# Patient Record
Sex: FEMALE | Race: WHITE | NOT HISPANIC OR LATINO | Employment: UNEMPLOYED | URBAN - METROPOLITAN AREA
[De-identification: names, ages, dates, MRNs, and addresses within clinical notes are randomized per-mention and may not be internally consistent; named-entity substitution may affect disease eponyms.]

---

## 2023-10-19 PROBLEM — F41.8 ANTICIPATORY ANXIETY: Status: ACTIVE | Noted: 2023-10-19

## 2023-10-19 PROBLEM — F31.9 BIPOLAR DEPRESSION (MULTI): Status: ACTIVE | Noted: 2023-10-19

## 2023-10-19 PROBLEM — F40.10 SOCIAL ANXIETY DISORDER: Status: ACTIVE | Noted: 2023-10-19

## 2023-10-19 PROBLEM — M26.69 TMJ CREPITUS: Status: ACTIVE | Noted: 2023-10-19

## 2023-10-19 PROBLEM — F33.0 MAJOR DEPRESSIVE DISORDER, RECURRENT EPISODE, MILD (CMS-HCC): Status: ACTIVE | Noted: 2023-10-19

## 2023-10-19 PROBLEM — H93.12 TINNITUS OF LEFT EAR: Status: ACTIVE | Noted: 2023-10-19

## 2023-10-19 PROBLEM — R94.120 ABNORMAL OTOACOUSTIC EMISSIONS TEST: Status: ACTIVE | Noted: 2023-10-19

## 2023-10-19 RX ORDER — PNV NO.95/FERROUS FUM/FOLIC AC 28MG-0.8MG
TABLET ORAL
COMMUNITY
End: 2023-10-20 | Stop reason: ALTCHOICE

## 2023-10-19 RX ORDER — BUSPIRONE HYDROCHLORIDE 15 MG/1
1 TABLET ORAL 2 TIMES DAILY
COMMUNITY
Start: 2018-04-17 | End: 2023-10-20 | Stop reason: ALTCHOICE

## 2023-10-19 RX ORDER — NORGESTIMATE AND ETHINYL ESTRADIOL 7DAYSX3 LO
KIT ORAL
COMMUNITY
End: 2023-10-20 | Stop reason: ALTCHOICE

## 2023-10-19 RX ORDER — DULOXETIN HYDROCHLORIDE 30 MG/1
CAPSULE, DELAYED RELEASE ORAL
COMMUNITY
Start: 2019-06-18 | End: 2023-10-20 | Stop reason: ALTCHOICE

## 2023-10-19 RX ORDER — CHOLECALCIFEROL (VITAMIN D3) 50 MCG
TABLET ORAL
COMMUNITY
End: 2023-10-20 | Stop reason: ALTCHOICE

## 2023-10-19 RX ORDER — PROPRANOLOL HYDROCHLORIDE 20 MG/1
TABLET ORAL
COMMUNITY
Start: 2019-06-18 | End: 2023-10-20 | Stop reason: ALTCHOICE

## 2023-10-20 ENCOUNTER — HOSPITAL ENCOUNTER (OUTPATIENT)
Dept: CARDIOLOGY | Facility: CLINIC | Age: 28
Discharge: HOME | End: 2023-10-20
Payer: COMMERCIAL

## 2023-10-20 ENCOUNTER — OFFICE VISIT (OUTPATIENT)
Dept: CARDIOLOGY | Facility: CLINIC | Age: 28
End: 2023-10-20
Payer: COMMERCIAL

## 2023-10-20 VITALS
OXYGEN SATURATION: 98 % | WEIGHT: 146.8 LBS | SYSTOLIC BLOOD PRESSURE: 131 MMHG | BODY MASS INDEX: 23.59 KG/M2 | HEART RATE: 84 BPM | DIASTOLIC BLOOD PRESSURE: 75 MMHG | HEIGHT: 66 IN

## 2023-10-20 DIAGNOSIS — R00.2 PALPITATIONS: ICD-10-CM

## 2023-10-20 DIAGNOSIS — R00.2 PALPITATIONS: Primary | ICD-10-CM

## 2023-10-20 PROCEDURE — 93246 EXT ECG>7D<15D RECORDING: CPT

## 2023-10-20 PROCEDURE — 99204 OFFICE O/P NEW MOD 45 MIN: CPT | Performed by: INTERNAL MEDICINE

## 2023-10-20 PROCEDURE — 99214 OFFICE O/P EST MOD 30 MIN: CPT | Performed by: INTERNAL MEDICINE

## 2023-10-20 PROCEDURE — 1036F TOBACCO NON-USER: CPT | Performed by: INTERNAL MEDICINE

## 2023-10-20 RX ORDER — RISPERIDONE 0.25 MG/1
0.25 TABLET ORAL DAILY
COMMUNITY

## 2023-10-20 RX ORDER — ISOTRETINOIN 40 MG/1
40 CAPSULE ORAL
COMMUNITY

## 2023-10-20 RX ORDER — ATORVASTATIN CALCIUM 80 MG/1
80 TABLET, FILM COATED ORAL DAILY
COMMUNITY

## 2023-10-20 RX ORDER — TRAZODONE HYDROCHLORIDE 50 MG/1
25 TABLET ORAL NIGHTLY
COMMUNITY

## 2023-10-20 ASSESSMENT — ENCOUNTER SYMPTOMS
FALLS: 0
DIARRHEA: 0
HEMOPTYSIS: 0
MEMORY LOSS: 0
FEVER: 0
OCCASIONAL FEELINGS OF UNSTEADINESS: 0
HEADACHES: 0
ABDOMINAL PAIN: 0
CONSTIPATION: 0
BLOATING: 0
NAUSEA: 0
DEPRESSION: 0
HEMATURIA: 0
COUGH: 0
DYSURIA: 0
LOSS OF SENSATION IN FEET: 0
ALTERED MENTAL STATUS: 0
CHILLS: 0
MYALGIAS: 0
WHEEZING: 0
DEPRESSION: 1
VOMITING: 0

## 2023-10-20 ASSESSMENT — PATIENT HEALTH QUESTIONNAIRE - PHQ9
2. FEELING DOWN, DEPRESSED OR HOPELESS: SEVERAL DAYS
1. LITTLE INTEREST OR PLEASURE IN DOING THINGS: NOT AT ALL
SUM OF ALL RESPONSES TO PHQ9 QUESTIONS 1 AND 2: 1

## 2023-10-20 ASSESSMENT — COLUMBIA-SUICIDE SEVERITY RATING SCALE - C-SSRS
1. IN THE PAST MONTH, HAVE YOU WISHED YOU WERE DEAD OR WISHED YOU COULD GO TO SLEEP AND NOT WAKE UP?: NO
6. HAVE YOU EVER DONE ANYTHING, STARTED TO DO ANYTHING, OR PREPARED TO DO ANYTHING TO END YOUR LIFE?: NO
2. HAVE YOU ACTUALLY HAD ANY THOUGHTS OF KILLING YOURSELF?: NO

## 2023-10-20 ASSESSMENT — PAIN SCALES - GENERAL: PAINLEVEL: 0-NO PAIN

## 2023-10-20 NOTE — PROGRESS NOTES
Consult requested by OLIVE Tellez    Chief complaint:  Palps     HPI  27 yo WF w/ h/o HLD, anx/dep now here for cardiology consult. Over the past few months, she is having occ palps (fluttering, hard beats, skips) x ~30 minutes, usually at night (reclined or supine), occ assoc hard pulse in R neck.   No chest pain. No dyspnea at rest. No ZAVALETA. No orthopnea/PND. No LH/dizzy/syncope. No edema. No claudication. No cough. +occ LLE pain.  ECG 9/23: SR (65), occ PVCs    Review of Systems   Constitutional: Negative for chills, fever and malaise/fatigue.   HENT:  Negative for hearing loss.    Eyes:  Negative for visual disturbance.   Respiratory:  Negative for cough, hemoptysis and wheezing.    Skin:  Negative for rash.   Musculoskeletal:  Negative for falls and myalgias.   Gastrointestinal:  Negative for bloating, abdominal pain, constipation, diarrhea, dysphagia, nausea and vomiting.   Genitourinary:  Negative for dysuria and hematuria.   Neurological:  Negative for headaches.   Psychiatric/Behavioral:  Negative for altered mental status, depression and memory loss.         Social History     Tobacco Use    Smoking status: Never    Smokeless tobacco: Never   Substance Use Topics    Alcohol use: Yes     Alcohol/week: 1.0 standard drink of alcohol     Types: 1 Glasses of wine per week        Family History   Problem Relation Name Age of Onset    Anxiety disorder Mother      Depression Mother      Thyroid disease Mother      Cancer Father      Anxiety disorder Mother's Sister      Depression Mother's Sister      Alcohol abuse Maternal Grandfather          Allergies   Allergen Reactions    Sertraline Unknown        Current Outpatient Medications   Medication Instructions    atorvastatin (LIPITOR) 80 mg, oral, Daily    ISOtretinoin (ACCUTANE) 40 mg, oral, 2 times daily with meals    NORETHINDRONE, CONTRACEPTIVE, ORAL     risperiDONE (RISPERDAL) 0.25 mg, oral, Daily    traZODone (DESYREL) 25 mg, oral, Nightly        Vitals:     10/20/23 1131   BP: 131/75   Pulse: 84   SpO2:         Physical Exam   GEN: NAD  Neck: no bruit  CV: RRR, no murmur  PULM: CTA B  ABD: soft, NT, ND  EXT: no edema  NEURO: nonfocal     Assessment/Plan   29 yo WF w/ h/o HLD, anx/dep now w/ occ palps of unclear etiology. Check monitor. Request labs from 9/23 (pending results, can increase K in diet). F/U PRN    Godwin Malave MD

## 2023-12-01 LAB — BODY SURFACE AREA: 1.76 M2

## 2023-12-06 ENCOUNTER — APPOINTMENT (OUTPATIENT)
Dept: RADIOLOGY | Facility: HOSPITAL | Age: 28
End: 2023-12-06
Payer: COMMERCIAL

## 2023-12-06 ENCOUNTER — HOSPITAL ENCOUNTER (EMERGENCY)
Facility: HOSPITAL | Age: 28
Discharge: HOME | End: 2023-12-06
Payer: COMMERCIAL

## 2023-12-06 VITALS
RESPIRATION RATE: 20 BRPM | BODY MASS INDEX: 23.3 KG/M2 | TEMPERATURE: 98.2 F | OXYGEN SATURATION: 97 % | HEIGHT: 66 IN | HEART RATE: 88 BPM | DIASTOLIC BLOOD PRESSURE: 95 MMHG | WEIGHT: 145 LBS | SYSTOLIC BLOOD PRESSURE: 131 MMHG

## 2023-12-06 DIAGNOSIS — M79.605 PAIN OF LEFT LOWER EXTREMITY: Primary | ICD-10-CM

## 2023-12-06 DIAGNOSIS — R60.9 SWELLING: ICD-10-CM

## 2023-12-06 PROCEDURE — 99281 EMR DPT VST MAYX REQ PHY/QHP: CPT

## 2023-12-06 PROCEDURE — 93971 EXTREMITY STUDY: CPT

## 2023-12-06 PROCEDURE — 99284 EMERGENCY DEPT VISIT MOD MDM: CPT | Mod: 25

## 2023-12-06 PROCEDURE — 93971 EXTREMITY STUDY: CPT | Performed by: RADIOLOGY

## 2023-12-06 ASSESSMENT — PAIN SCALES - GENERAL: PAINLEVEL_OUTOF10: 5 - MODERATE PAIN

## 2023-12-06 ASSESSMENT — LIFESTYLE VARIABLES
REASON UNABLE TO ASSESS: NO
EVER HAD A DRINK FIRST THING IN THE MORNING TO STEADY YOUR NERVES TO GET RID OF A HANGOVER: NO
HAVE PEOPLE ANNOYED YOU BY CRITICIZING YOUR DRINKING: NO
HAVE YOU EVER FELT YOU SHOULD CUT DOWN ON YOUR DRINKING: NO
EVER FELT BAD OR GUILTY ABOUT YOUR DRINKING: NO

## 2023-12-06 ASSESSMENT — COLUMBIA-SUICIDE SEVERITY RATING SCALE - C-SSRS
2. HAVE YOU ACTUALLY HAD ANY THOUGHTS OF KILLING YOURSELF?: NO
1. IN THE PAST MONTH, HAVE YOU WISHED YOU WERE DEAD OR WISHED YOU COULD GO TO SLEEP AND NOT WAKE UP?: NO
6. HAVE YOU EVER DONE ANYTHING, STARTED TO DO ANYTHING, OR PREPARED TO DO ANYTHING TO END YOUR LIFE?: NO

## 2023-12-06 ASSESSMENT — PAIN DESCRIPTION - PAIN TYPE: TYPE: ACUTE PAIN

## 2023-12-06 ASSESSMENT — PAIN DESCRIPTION - ORIENTATION: ORIENTATION: LEFT

## 2023-12-06 ASSESSMENT — PAIN DESCRIPTION - LOCATION: LOCATION: LEG

## 2023-12-06 ASSESSMENT — PAIN - FUNCTIONAL ASSESSMENT: PAIN_FUNCTIONAL_ASSESSMENT: 0-10

## 2023-12-07 DIAGNOSIS — R00.2 PALPITATIONS: ICD-10-CM

## 2023-12-07 DIAGNOSIS — R60.9 EDEMA, UNSPECIFIED TYPE: Primary | ICD-10-CM

## 2023-12-07 DIAGNOSIS — R06.09 DOE (DYSPNEA ON EXERTION): ICD-10-CM

## 2023-12-07 NOTE — ED PROVIDER NOTES
HPI   Chief Complaint   Patient presents with    Leg Swelling     C/O LEFT LEG PAIN AND SWELLING THAT BECAME WORSE YESTERDAY       28-year-old female with no significant past medical history presents the emergency department today complaining of left leg pain and swelling.  Patient states that this has been present intermittently since August.  States in August she was having cramping for approximately 3 to 4 days.  It was self resolved.  No injury to the region that she is aware of.  States then a couple of days ago she was having some cramping in the left lower extremity and throughout the day today feels as though it is swollen and the cramping is worsened in the posterior hamstring and calf.  No numbness or tingling extremity.  Again no injury.  Denies any chest pain or shortness of breath.  Patient does take a minipill.                          Artie Coma Scale Score: 15                  Patient History   Past Medical History:   Diagnosis Date    Major depressive disorder, recurrent, in partial remission (CMS/HCC) 05/14/2018    Major depressive disorder, recurrent episode, in partial remission    Major depressive disorder, recurrent, moderate (CMS/HCC) 01/30/2018    Major depressive disorder, recurrent episode, moderate     Past Surgical History:   Procedure Laterality Date    CT HEAD ANGIO W AND WO IV CONTRAST  5/31/2019    CT HEAD ANGIO W AND WO IV CONTRAST 5/31/2019 [facility] ANCILLARY LEGACY    OTHER SURGICAL HISTORY  05/21/2019    Adenoidectomy    OTHER SURGICAL HISTORY  05/21/2019    Ear pressure equalization tube insertion    OTHER SURGICAL HISTORY  05/21/2019    [place] tooth extraction     Family History   Problem Relation Name Age of Onset    Anxiety disorder Mother      Depression Mother      Thyroid disease Mother      Cancer Father      Anxiety disorder Mother's Sister      Depression Mother's Sister      Alcohol abuse Maternal Grandfather       Social History     Tobacco Use    Smoking status: Never     Smokeless tobacco: Never   Substance Use Topics    Alcohol use: Yes     Alcohol/week: 1.0 standard drink of alcohol     Types: 1 Glasses of wine per week    Drug use: Yes     Types: Marijuana     Comment: medical       Physical Exam   ED Triage Vitals [12/06/23 1820]   Temp Heart Rate Resp BP   36.8 °C (98.2 °F) 102 20 (!) 147/106      SpO2 Temp Source Heart Rate Source Patient Position   97 % Tympanic -- Sitting      BP Location FiO2 (%)     Left arm --       Physical Exam  Vitals reviewed.   Constitutional:       Appearance: Normal appearance.   HENT:      Head: Normocephalic.   Cardiovascular:      Rate and Rhythm: Normal rate and regular rhythm.   Pulmonary:      Effort: Pulmonary effort is normal.      Breath sounds: Normal breath sounds.   Abdominal:      General: Abdomen is flat.      Palpations: Abdomen is soft.   Musculoskeletal:      Right lower leg: No edema.      Left lower leg: No edema.      Comments: Slight tenderness to the posterior aspect of the left lower extremity with no overlying skin changes or swelling noted   Skin:     General: Skin is warm and dry.      Capillary Refill: Capillary refill takes less than 2 seconds.   Neurological:      Mental Status: She is alert.         Labs Reviewed - No data to display  Pain Management Panel           No data to display              Vascular US Lower Extremity Venous Duplex Left   Final Result   No deep venous thrombosis of the  visualized left lower extremity.        MACRO:   None        Signed by: Janina Cotton 12/6/2023 9:06 PM   Dictation workstation:   [id number]          ED Course & MDM   Diagnoses as of 12/06/23 2119   Pain of left lower extremity       Medical Decision Making  On initial evaluation patient is well-appearing in the emergency department at this time.  She does have some subjective feeling of left lower extremity swelling.  I do not appreciate any significant swelling at this time.  Pulses are intact.  There is no color changes.   No traumatic injury to the leg therefore will not provide any x-ray imaging at this point.  Ultrasound was obtained of the extremity which shows no DVT of the visualized left lower extremity.  She has no risk factors for peripheral vascular disease.  I discussed results in depth with the patient she verbalized understanding of plan educated her to use over-the-counter medications ibuprofen and Tylenol.  Patient will be discharged home in stable condition with close outpatient follow-up.  States that she has seen both her PCP and cardiologist for this problem in the past.        Procedure  Procedures        I discussed the differential, results and discharge plan with the patient and/or family/friend/caregiver if present.  I emphasized the importance of follow-up with the physician I referred them to in the timeframe recommended.  I explained reasons for the patient to return to the Emergency Department. Additional verbal discharge instructions were also given and discussed with the patient to supplement those generated by the EMR. We also discussed medications that were prescribed (if any) including common side effects and interactions. The patient was advised to abstain from driving, operating heavy machinery or making significant decisions while taking medications such as opiates and muscle relaxers that may impair this. All questions were addressed.  They understand return precautions and discharge instructions. The patient and/or family/friend/caregiver expressed understanding.           Radha Braun, CAESAR-CNP  12/06/23 0818

## 2023-12-15 ENCOUNTER — HOSPITAL ENCOUNTER (OUTPATIENT)
Dept: CARDIOLOGY | Facility: CLINIC | Age: 28
Discharge: HOME | End: 2023-12-15
Payer: COMMERCIAL

## 2023-12-15 DIAGNOSIS — R00.2 PALPITATIONS: ICD-10-CM

## 2023-12-15 DIAGNOSIS — R60.1 GENERALIZED EDEMA: ICD-10-CM

## 2023-12-15 DIAGNOSIS — R06.09 DOE (DYSPNEA ON EXERTION): ICD-10-CM

## 2023-12-15 DIAGNOSIS — R60.9 EDEMA, UNSPECIFIED TYPE: ICD-10-CM

## 2023-12-15 PROCEDURE — 93306 TTE W/DOPPLER COMPLETE: CPT | Performed by: INTERNAL MEDICINE

## 2023-12-15 PROCEDURE — 93306 TTE W/DOPPLER COMPLETE: CPT

## 2023-12-18 LAB
AORTIC VALVE PEAK VELOCITY: 1.23
AV PEAK GRADIENT: 6.1
AVA (PEAK VEL): 2.43
EJECTION FRACTION APICAL 4 CHAMBER: 68.8
EJECTION FRACTION: 65
LEFT ATRIUM VOLUME AREA LENGTH INDEX BSA: 15
LEFT VENTRICLE INTERNAL DIMENSION DIASTOLE: 4.3 (ref 3.5–6)
LEFT VENTRICULAR OUTFLOW TRACT DIAMETER: 1.93
MITRAL VALVE E/A RATIO: 1.38
MITRAL VALVE E/E' RATIO: 5.5
RIGHT VENTRICLE FREE WALL PEAK S': 16
RIGHT VENTRICLE PEAK SYSTOLIC PRESSURE: 23.4
TRICUSPID ANNULAR PLANE SYSTOLIC EXCURSION: 2.9

## 2024-06-16 NOTE — PROGRESS NOTES
"Division of Minimally Invasive Gynecologic Surgery  [facility]    06/16/24 Gynecology Consult     HISTORY OF PRESENT ILLNESS:  Meredith Mckenzie 29 y.o. P0 presents for dysmenorrhea, vaginismus, vulvodynia, and high tone pelvic floor dysfunction. Currently amenorrheic on norethindrone mini-pill.      Starting at age 11 roughly, she had menarche. Dysmenorrhea was severe, keeping her home from school. +Dsychezia and diarrhea. Around age 13, she saw a Gyn. She underwent extremely painful and traumatic pain. She was told insertional pain and period pain was normal. She start Ortho-Tricyclen and continued this until age 24. On this, menses were monthly and predictable. Some improvement in pain (10/10-->7/10).     At age 24, she was diagnosed w/ high cholesteral and started on atrovastatin and stopped KERRIE. She has since been norethindrone mini-pill since then. She has amenorrhea on this. She has not had a true period since starting this method.     Even without her cycle, she would still have random episodes of painful cramping. In 2022, she saw a GYN at [facility] and was diagnosed w/ vulvodynia and vaginismus. She then started lidocaine cream and dilator therapy. This did eventually allow her to tolerate a speculum exam for pap smear.     Over the past few months, pain has been getting worse. It now occurs w/ orgasm and arousal. ON 4/30, she had an episode of intense and severe cramping after orgasm. This was followed by an episode of vomiting and diarrhea. She then had 12 days of cramping and 6 days of bleeding during this episode (brown-red w/ mucus consistency, not like a normal period). Cramps have continued off and on since this episode, \"I am living with my heating pad on\".     She has never been able to use a tampon or insert anything vaginally. Primarily severe pain at introitus with this.     She has been working on vulvovaginal dilation, but prefers to use vibrators or digits (states " "dilators feel \"too medical\"). She can now insert two fingers into the vagina and does not have deep vaginal pain with vibrator insertion. She does still have insertional pain.     She also reports low sex drive.     She has tried:  - Cymbalta, Prozac, Trazodone, Risperdal - she's tried multiple anti-depressants, anti-anxiety medications, and mood stabilizers   - Lidocaine jelly: helpful a little bit   - Vibrators/digits for dilation    She has not tried:   - Vaginal Valium   - Pelvic floor TPI  - Pelvic floor Botox   - Yoga for pelvic pain  - Pelvic floor PT     She has not tried PFPT. She states she is not ready for this.     Of note, she recently had a broken finger w/o injury. DEXA pending for next week.     Imaging:   - Pelvic US 2/2022 showed uterus measuring 7cm x 2cm x 3cm. Bilateral ovaries WNL.   Screening:   - Last pap 2022 WNL per pt, no hx of CIN2-3  PMHx: depression/anxiety, BD2, HLD, cystic acne, palpitations (s/p normal Cards consult, PVC's)  PSHx: wisdom teeth, adenoids, ear tubes     PAST MEDICAL HISTORY:  Past Medical History:   Diagnosis Date    Anxiety     Bipolar 2 disorder (Multi)     Cystic acne     Major depressive disorder, recurrent, in partial remission (CMS-[facility]) 05/14/2018    Major depressive disorder, recurrent episode, in partial remission    Palpitations     s/p normal Cards eval, PVC's      PAST SURGICAL HISTORY:  Past Surgical History:   Procedure Laterality Date    ADENOIDECTOMY      CT HEAD ANGIO W AND WO IV CONTRAST  05/31/2019    CT HEAD ANGIO W AND WO IV CONTRAST 5/31/2019 [facility] [place] [facility]    TYMPANOSTOMY TUBE PLACEMENT      WISDOM TOOTH EXTRACTION       PHYSICAL EXAMINATION:  VITAL SIGNS: /86   Pulse 83   Ht 1.676 m (5' 6\")   Wt 64.6 kg (142 lb 6.4 oz)   LMP 06/10/2024 (Approximate) Comment: birth control pill  BMI 22.98 kg/m²      Constitutional:  No acute distress, well-nourished and well-developed  HEENT: EOM grossly intact, MMM, neck supple and with full " ROM  Pulm:  Effort normal. No accessory muscle usage.  No respiratory distress.  Neurological:  She is alert and oriented to person place and time.  Skin: Warm, no pallor.  Psychiatric:  She has normal mood and affect.    ASSESSMENT:  Meredith Mckenzie 29 y.o. P0 presents for dysmenorrhea, vaginismus, vulvodynia, and high tone pelvic floor dysfunction. Currently amenorrheic on norethindrone mini-pill.    - We discussed today the multiple etiologies of chronic pelvic pain, including endometriosis, adenomyosis, GI etiologies, MSK etiologies, and centralization of pain. I am suspicious of complex etiology in her case, likely with a component of pelvic floor dysfunction and hypoestrogenic vulvovaginal atrophy from prolonged hormonal suppression. She may also have a component of neuralgia and/or centralization of pain as well.   - We discussed option today, including continuation of mini-pill vs switching to other POP or Mirena. She would prefer to continue on mini-pill for now.  - We also discussed the use of vaginal estrogen cream and the ways in which this may help w/ vulvodynia and potentially improve bleeding if hypoestrogenic changes has contributed to bleeding.   - We also discussed management of pelvic floor tension myalgia and the importance of PFPT. In her case, I would also recommend addition of vaginal Valium as this may improve tolerance and benefit of PFPT. She is open to both.   - We discuss surgery to assess for endometriosis, but she would prefer to avoid this for now and I do agree that starting with treatment of pelvic floor dysfunction is a better approach.     Plan:  - Trial of vaginal Valium 2.5mg (reports significant sensitivity to medications) w/ Flexeril bridge  - Vaginal estrogen cream   - PFPT, yoga for pelvic pain  - Other options we discussed which we can consider in the future include excisional surgery, pudendal nerve block, pelvic floor Botox, and referral to Pain Medicine     Claritza RICO  MD Eliceo  06/16/24  3:31 PM

## 2024-06-20 ENCOUNTER — OFFICE VISIT (OUTPATIENT)
Dept: OBSTETRICS AND GYNECOLOGY | Facility: CLINIC | Age: 29
End: 2024-06-20
Payer: COMMERCIAL

## 2024-06-20 VITALS
SYSTOLIC BLOOD PRESSURE: 125 MMHG | BODY MASS INDEX: 22.88 KG/M2 | HEART RATE: 83 BPM | HEIGHT: 66 IN | DIASTOLIC BLOOD PRESSURE: 86 MMHG | WEIGHT: 142.4 LBS

## 2024-06-20 DIAGNOSIS — N95.2 VAGINAL ATROPHY: ICD-10-CM

## 2024-06-20 DIAGNOSIS — R10.2 PELVIC PAIN: Primary | ICD-10-CM

## 2024-06-20 PROCEDURE — 99204 OFFICE O/P NEW MOD 45 MIN: CPT | Performed by: STUDENT IN AN ORGANIZED HEALTH CARE EDUCATION/TRAINING PROGRAM

## 2024-06-20 PROCEDURE — 1036F TOBACCO NON-USER: CPT | Performed by: STUDENT IN AN ORGANIZED HEALTH CARE EDUCATION/TRAINING PROGRAM

## 2024-06-20 PROCEDURE — 99214 OFFICE O/P EST MOD 30 MIN: CPT | Performed by: STUDENT IN AN ORGANIZED HEALTH CARE EDUCATION/TRAINING PROGRAM

## 2024-06-20 RX ORDER — CYCLOBENZAPRINE HCL 5 MG
5 TABLET ORAL 2 TIMES DAILY PRN
Qty: 30 TABLET | Refills: 0 | Status: SHIPPED | OUTPATIENT
Start: 2024-06-20 | End: 2024-07-04

## 2024-06-20 RX ORDER — ESTRADIOL 0.1 MG/G
CREAM VAGINAL
Qty: 42.5 G | Refills: 12 | Status: SHIPPED | OUTPATIENT
Start: 2024-06-20

## 2024-06-20 ASSESSMENT — PAIN SCALES - GENERAL: PAINLEVEL: 0-NO PAIN

## 2024-08-28 ENCOUNTER — TELEMEDICINE (OUTPATIENT)
Dept: OBSTETRICS AND GYNECOLOGY | Facility: CLINIC | Age: 29
End: 2024-08-28
Payer: COMMERCIAL

## 2024-08-28 DIAGNOSIS — R10.2 PELVIC PAIN: Primary | ICD-10-CM

## 2024-08-28 DIAGNOSIS — N93.9 ABNORMAL UTERINE BLEEDING (AUB): ICD-10-CM

## 2024-08-28 PROCEDURE — 99215 OFFICE O/P EST HI 40 MIN: CPT | Mod: 95 | Performed by: STUDENT IN AN ORGANIZED HEALTH CARE EDUCATION/TRAINING PROGRAM

## 2024-08-28 PROCEDURE — 99215 OFFICE O/P EST HI 40 MIN: CPT | Performed by: STUDENT IN AN ORGANIZED HEALTH CARE EDUCATION/TRAINING PROGRAM

## 2024-08-28 NOTE — PROGRESS NOTES
Division of Minimally Invasive Gynecologic Surgery  [facility]    08/28/24 Gynecology Visit - Virtual Telephone Visit     CC: Follow up pelvic pain     Meredith Mckenzie is a 29 y.o. P0 presents for dysmenorrhea, vaginismus, vulvodynia, and high tone pelvic floor dysfunction. Currently amenorrheic on norethindrone mini-pill. At last visit, she was started on vaginal Valium 2.5mg PRN, vaginal estrogen cream, and referred for PFPT. She was also sent information on yoga for pelvic pain. She presents today to further discuss endometriosis and possible diagnostic surgery.    Estrogen cream has increased natural dilation, but does seem to increase cramping. This has also decreased pain w/ insertion and she has been able to practice dilator therapy w/o use of topical lidocaine. Vaginal Valium has not been particularly helpful. Yoga for pelvic pain has been helpful.     She also established w/ PFPT (Rachell Eddy at [facility]). She is really happy with provider, and is already noticing improvements.     She has unfortunately started to have breakthrough bleeding and periods with extreme pain on bleeding days. Continues to have pelvic cramping at random, as well as w/ arousal and orgasm.     She has tried:  - Cymbalta, Prozac, Trazodone, Risperdal - she's tried multiple anti-depressants, anti-anxiety medications, and mood stabilizers   - Lidocaine jelly: helpful a little bit   - Vibrators/digits for dilation  - Flexeril: Significant sedation      She has not tried:   - Pelvic floor TPI  - Pelvic floor Botox      Imaging:   - Pelvic US 2/2022 showed uterus measuring 7cm x 2cm x 3cm. Bilateral ovaries WNL.   Screening:   - Last pap 2022 WNL per pt, no hx of CIN2-3  PMHx: depression/anxiety, BD2, HLD, cystic acne, palpitations (s/p normal Cards consult, PVC's)  PSHx: wisdom teeth, adenoids, ear tubes       ROS: reviewed and negative    PE: Virtual Telephone Visit     A/P: Meredith Mckenzie is a  29 y.o. P0 presents for dysmenorrhea, vaginismus, vulvodynia, and high tone pelvic floor dysfunction. Currently amenorrheic on norethindrone mini-pill. At last visit, she was started on vaginal Valium 2.5mg PRN, vaginal estrogen cream, and referred for PFPT. She was also sent information on yoga for pelvic pain. She presents today to further discuss endometriosis and possible diagnostic surgery.    We discussed options today for both improved control of menses and improved pain control, including trial of Slynd/Mirena (or alternative suppression options) and surgery. She had questions today about surgery for endometriosis.     - We discussed today the multiple etiologies of chronic pelvic pain, including endometriosis, adenomyosis, GI etiologies, MSK etiologies, and centralization of pain. I am primarily suspicious of endometriosis in her case. We discussed the role of surgical excision in the management of endometriosis and the estimated 15-20% recurrence rate in the future. We also reviewed the association between endometriosis and adenomyosis, as well as the fact that definitive diagnosis of adenomyosis cannot be made without hysterectomy.   - We discussed the natural history of endometriosis and the fact that hormonal suppression is thought to have a role in slowing disease progression and managing symptoms of endometriosis, but cannot definitively reverse or eliminate endometriosis.   - She has tried multiple medical approaches over the years and has not gotten adequate relief.   - I do think surgical excision is appropriate for this patient, and she is considering this option. We did however discuss that given the likelihood of pelvic floor dysfunction and the possibility of centralization of pain, she may not experience complete relief from surgery. She is aware she may require further treatment for these etiologies of pain after surgery.   - We discussed the typical procedure for robotic excision of  endometriosis and possible appendectomy. We reviewed the risks/benefits/alternatives to surgery. She is aware of the risk of bleeding, infection, and damage to nearby structures, including reproductive structures, bladder, ureters, bowel, and vasculature. She is agreeable to blood transfusion if recommended. We reviewed the possibility of appendectomy, as well as the administration of antibiotics if appendectomy is performed. She understands and is in agreement with this plan.  - We also discussed placement of Mirena IUD at time of surgery. She will consider  - I recommend preoperative MRI to evaluate for the presence of deep infiltrating endometriosis, with particular attention to bowel endometriosis.    Plan:  - Considering robotic excision of endometriosis, possible appendectomy, possible Mirena, any indicated procedure. PAT: Yes. Main only. Will need MRI ordered if plans to proceed.   - She will let me know when she has made a decision about whether or not she wants to proceed with surgery and/or try alternative suppression methods. Case request/MRI order to be placed as indicated at that time.     Claritza Fenton MD  Division of Minimally Invasive Gynecologic Surgery  [facility]

## 2024-08-30 ENCOUNTER — PREP FOR PROCEDURE (OUTPATIENT)
Dept: OBSTETRICS AND GYNECOLOGY | Facility: CLINIC | Age: 29
End: 2024-08-30
Payer: COMMERCIAL

## 2024-08-30 DIAGNOSIS — N80.9 ENDOMETRIOSIS: Primary | ICD-10-CM

## 2024-08-30 RX ORDER — SODIUM CHLORIDE, SODIUM LACTATE, POTASSIUM CHLORIDE, CALCIUM CHLORIDE 600; 310; 30; 20 MG/100ML; MG/100ML; MG/100ML; MG/100ML
20 INJECTION, SOLUTION INTRAVENOUS CONTINUOUS
OUTPATIENT
Start: 2024-08-30

## 2024-08-30 RX ORDER — ACETAMINOPHEN 325 MG/1
975 TABLET ORAL ONCE
OUTPATIENT
Start: 2024-08-30 | End: 2024-08-30

## 2024-08-30 RX ORDER — GABAPENTIN 600 MG/1
600 TABLET ORAL ONCE
OUTPATIENT
Start: 2024-08-30 | End: 2024-08-30

## 2024-08-30 RX ORDER — CELECOXIB 400 MG/1
400 CAPSULE ORAL ONCE
OUTPATIENT
Start: 2024-08-30 | End: 2024-08-30

## 2024-09-05 PROBLEM — N80.9 ENDOMETRIOSIS: Status: ACTIVE | Noted: 2024-08-30

## 2024-09-06 DIAGNOSIS — Z01.818 PREOP EXAMINATION: Primary | ICD-10-CM

## 2024-09-10 ENCOUNTER — APPOINTMENT (OUTPATIENT)
Dept: OBSTETRICS AND GYNECOLOGY | Facility: CLINIC | Age: 29
End: 2024-09-10
Payer: COMMERCIAL

## 2024-09-17 ENCOUNTER — HOSPITAL ENCOUNTER (OUTPATIENT)
Dept: RADIOLOGY | Facility: HOSPITAL | Age: 29
Discharge: HOME | End: 2024-09-17
Payer: COMMERCIAL

## 2024-09-17 DIAGNOSIS — N80.9 ENDOMETRIOSIS: ICD-10-CM

## 2024-09-17 PROCEDURE — 72197 MRI PELVIS W/O & W/DYE: CPT | Performed by: RADIOLOGY

## 2024-09-17 PROCEDURE — 72197 MRI PELVIS W/O & W/DYE: CPT

## 2024-09-17 PROCEDURE — 2550000001 HC RX 255 CONTRASTS: Performed by: STUDENT IN AN ORGANIZED HEALTH CARE EDUCATION/TRAINING PROGRAM

## 2024-09-17 PROCEDURE — A9575 INJ GADOTERATE MEGLUMI 0.1ML: HCPCS | Performed by: STUDENT IN AN ORGANIZED HEALTH CARE EDUCATION/TRAINING PROGRAM

## 2024-09-17 RX ORDER — GADOTERATE MEGLUMINE 376.9 MG/ML
13 INJECTION INTRAVENOUS
Status: CANCELLED | OUTPATIENT
Start: 2024-09-17

## 2024-09-17 RX ORDER — GADOTERATE MEGLUMINE 376.9 MG/ML
13 INJECTION INTRAVENOUS
Status: COMPLETED | OUTPATIENT
Start: 2024-09-17 | End: 2024-09-17

## 2024-09-25 ENCOUNTER — APPOINTMENT (OUTPATIENT)
Dept: OBSTETRICS AND GYNECOLOGY | Facility: CLINIC | Age: 29
End: 2024-09-25
Payer: COMMERCIAL

## 2024-10-01 ENCOUNTER — APPOINTMENT (OUTPATIENT)
Dept: OBSTETRICS AND GYNECOLOGY | Facility: CLINIC | Age: 29
End: 2024-10-01
Payer: COMMERCIAL

## 2024-10-09 ENCOUNTER — PRE-ADMISSION TESTING (OUTPATIENT)
Dept: PREADMISSION TESTING | Facility: HOSPITAL | Age: 29
End: 2024-10-09
Payer: COMMERCIAL

## 2024-10-09 ENCOUNTER — APPOINTMENT (OUTPATIENT)
Dept: OBSTETRICS AND GYNECOLOGY | Facility: CLINIC | Age: 29
End: 2024-10-09
Payer: COMMERCIAL

## 2024-10-09 VITALS
OXYGEN SATURATION: 100 % | HEIGHT: 66 IN | TEMPERATURE: 98.3 F | HEART RATE: 84 BPM | DIASTOLIC BLOOD PRESSURE: 80 MMHG | WEIGHT: 142.7 LBS | SYSTOLIC BLOOD PRESSURE: 121 MMHG | BODY MASS INDEX: 22.93 KG/M2

## 2024-10-09 DIAGNOSIS — Z01.818 PREOPERATIVE EXAMINATION: Primary | ICD-10-CM

## 2024-10-09 DIAGNOSIS — F41.9 ANXIETY: Primary | ICD-10-CM

## 2024-10-09 DIAGNOSIS — Z01.818 PREOP EXAMINATION: ICD-10-CM

## 2024-10-09 LAB
ABO GROUP (TYPE) IN BLOOD: NORMAL
ANION GAP SERPL CALC-SCNC: 15 MMOL/L (ref 10–20)
ANTIBODY SCREEN: NORMAL
BUN SERPL-MCNC: 14 MG/DL (ref 6–23)
CALCIUM SERPL-MCNC: 9.9 MG/DL (ref 8.6–10.6)
CHLORIDE SERPL-SCNC: 102 MMOL/L (ref 98–107)
CO2 SERPL-SCNC: 25 MMOL/L (ref 21–32)
CREAT SERPL-MCNC: 0.73 MG/DL (ref 0.5–1.05)
EGFRCR SERPLBLD CKD-EPI 2021: >90 ML/MIN/1.73M*2
ERYTHROCYTE [DISTWIDTH] IN BLOOD BY AUTOMATED COUNT: 11.9 % (ref 11.5–14.5)
GLUCOSE SERPL-MCNC: 88 MG/DL (ref 74–99)
HCT VFR BLD AUTO: 43.7 % (ref 36–46)
HGB BLD-MCNC: 14.5 G/DL (ref 12–16)
MCH RBC QN AUTO: 31 PG (ref 26–34)
MCHC RBC AUTO-ENTMCNC: 33.2 G/DL (ref 32–36)
MCV RBC AUTO: 94 FL (ref 80–100)
NRBC BLD-RTO: 0 /100 WBCS (ref 0–0)
PLATELET # BLD AUTO: 399 X10*3/UL (ref 150–450)
POTASSIUM SERPL-SCNC: 5 MMOL/L (ref 3.5–5.3)
RBC # BLD AUTO: 4.67 X10*6/UL (ref 4–5.2)
RH FACTOR (ANTIGEN D): NORMAL
SODIUM SERPL-SCNC: 137 MMOL/L (ref 136–145)
WBC # BLD AUTO: 8.5 X10*3/UL (ref 4.4–11.3)

## 2024-10-09 PROCEDURE — 99215 OFFICE O/P EST HI 40 MIN: CPT | Performed by: STUDENT IN AN ORGANIZED HEALTH CARE EDUCATION/TRAINING PROGRAM

## 2024-10-09 PROCEDURE — 1036F TOBACCO NON-USER: CPT | Performed by: STUDENT IN AN ORGANIZED HEALTH CARE EDUCATION/TRAINING PROGRAM

## 2024-10-09 PROCEDURE — 36415 COLL VENOUS BLD VENIPUNCTURE: CPT

## 2024-10-09 PROCEDURE — 86901 BLOOD TYPING SEROLOGIC RH(D): CPT

## 2024-10-09 PROCEDURE — 85027 COMPLETE CBC AUTOMATED: CPT

## 2024-10-09 PROCEDURE — 80048 BASIC METABOLIC PNL TOTAL CA: CPT

## 2024-10-09 PROCEDURE — 99204 OFFICE O/P NEW MOD 45 MIN: CPT | Performed by: NURSE PRACTITIONER

## 2024-10-09 RX ORDER — LORAZEPAM 0.5 MG/1
TABLET ORAL
Qty: 2 TABLET | Refills: 0 | Status: SHIPPED | OUTPATIENT
Start: 2024-10-09

## 2024-10-09 RX ORDER — TRETINOIN 0.25 MG/G
CREAM TOPICAL
COMMUNITY
Start: 2024-03-29

## 2024-10-09 ASSESSMENT — DUKE ACTIVITY SCORE INDEX (DASI)
CAN YOU PARTICIPATE IN STRENOUS SPORTS LIKE SWIMMING, SINGLES TENNIS, FOOTBALL, BASKETBALL, OR SKIING: YES
CAN YOU DO LIGHT WORK AROUND THE HOUSE LIKE DUSTING OR WASHING DISHES: YES
CAN YOU DO MODERATE WORK AROUND THE HOUSE LIKE VACUUMING, SWEEPING FLOORS OR CARRYING GROCERIES: YES
CAN YOU WALK INDOORS, SUCH AS AROUND YOUR HOUSE: YES
DASI METS SCORE: 9.9
CAN YOU CLIMB A FLIGHT OF STAIRS OR WALK UP A HILL: YES
CAN YOU TAKE CARE OF YOURSELF (EAT, DRESS, BATHE, OR USE TOILET): YES
CAN YOU HAVE SEXUAL RELATIONS: YES
CAN YOU DO YARD WORK LIKE RAKING LEAVES, WEEDING OR PUSHING A MOWER: YES
TOTAL_SCORE: 58.2
CAN YOU WALK A BLOCK OR TWO ON LEVEL GROUND: YES
CAN YOU DO HEAVY WORK AROUND THE HOUSE LIKE SCRUBBING FLOORS OR LIFTING AND MOVING HEAVY FURNITURE: YES
CAN YOU RUN A SHORT DISTANCE: YES
CAN YOU PARTICIPATE IN MODERATE RECREATIONAL ACTIVITIES LIKE GOLF, BOWLING, DANCING, DOUBLES TENNIS OR THROWING A BASEBALL OR FOOTBALL: YES

## 2024-10-09 ASSESSMENT — ENCOUNTER SYMPTOMS
CARDIOVASCULAR NEGATIVE: 1
GASTROINTESTINAL NEGATIVE: 1
NEUROLOGICAL NEGATIVE: 1
MUSCULOSKELETAL NEGATIVE: 1
RESPIRATORY NEGATIVE: 1
NECK NEGATIVE: 1
EYES NEGATIVE: 1
ENDOCRINE NEGATIVE: 1
CONSTITUTIONAL NEGATIVE: 1

## 2024-10-09 ASSESSMENT — LIFESTYLE VARIABLES: SMOKING_STATUS: NONSMOKER

## 2024-10-09 NOTE — CPM/PAT H&P
CPM/PAT Evaluation       Name: Meredith Mckenzie (Meredith Mckenzie)  /Age:  y.o.     Visit Type:   In-Person       Chief Complaint: Endometriosis scheduled for surgery    HPI: Patient is a 29-year-old female scheduled for robotic excision of endometriosis with possible appendectomy and possible Mirena placement on 2024 for treatment of endometriosis.  The patient is referred by Dr. Claritza Fenton for preoperative evaluation of anxiety, depression, bipolar disorder, hyperlipidemia, palpitations worked up and found to be PVCs.    Past Medical History:   Diagnosis Date    Anxiety     Bipolar 2 disorder (Multi)     Cystic acne     Hyperlipidemia     Major depressive disorder, recurrent, in partial remission ([facility]) 2018    Major depressive disorder, recurrent episode, in partial remission    Palpitations     s/p normal Cards eval, PVC's       Past Surgical History:   Procedure Laterality Date    ADENOIDECTOMY      CT HEAD ANGIO W AND WO IV CONTRAST  2019    CT HEAD ANGIO W AND WO IV CONTRAST 2019 [facility] ANCILLARY LEGACY    TYMPANOSTOMY TUBE PLACEMENT      WISDOM TOOTH EXTRACTION         Patient  has no history on file for sexual activity.    Family History   Problem Relation Name Age of Onset    Anxiety disorder Mother      Depression Mother      Thyroid disease Mother      Cancer Father      Anxiety disorder Mother's Sister      Depression Mother's Sister      Alcohol abuse Maternal Grandfather         Allergies   Allergen Reactions    Sertraline Nausea/vomiting       Prior to Admission medications    Medication Sig Start Date End Date Taking? Authorizing Provider   atorvastatin (Lipitor) 80 mg tablet Take 1 tablet (80 mg) by mouth once daily.   Yes Historical Provider, MD   estradiol (Estrace) 0.01 % (0.1 mg/gram) vaginal cream Use nightly for one month. You may use the applicator if you prefer, or you can place a dime-sized amount on your finger and spread over the opening of  the vagina. Wash hands well after use. 6/20/24  Yes Claritza Fenton MD   NORETHINDRONE, CONTRACEPTIVE, ORAL  11/15/21  Yes Historical Provider, MD   tretinoin (Retin-A) 0.025 % cream apply pea sized amount to full face 2-3 nights per week, increase to nightly as tolerated. Mix directly with an oil free moisturizer if too drying. 3/29/24  Yes Historical Provider, MD   miscellaneous medical supply misc 1 suppository 2 times a day as needed (Pelvic pain). You may use two suppositories at a time if needed.  Patient not taking: Reported on 10/9/2024 6/20/24 10/9/24  Claritza Fenton MD        PAT ROS:   Constitutional:   neg    Neuro/Psych:   neg    Eyes:   neg     use of corrective lenses  Ears:   neg    Nose:   neg    Mouth:   neg    Throat:   neg    Neck:   neg    Cardio:   neg    Respiratory:   neg    Endocrine:   neg    GI:   neg    :   neg    Musculoskeletal:   neg    Hematologic:   neg    Skin:  neg        Physical Exam  Vitals reviewed.   Constitutional:       Appearance: Normal appearance.   HENT:      Head: Normocephalic.      Mouth/Throat:      Mouth: Mucous membranes are moist.   Eyes:      Conjunctiva/sclera: Conjunctivae normal.   Neck:      Vascular: No carotid bruit.   Cardiovascular:      Rate and Rhythm: Normal rate and regular rhythm.      Pulses: Normal pulses.      Heart sounds: Normal heart sounds.   Pulmonary:      Effort: Pulmonary effort is normal.      Breath sounds: Normal breath sounds.   Abdominal:      Palpations: Abdomen is soft.      Tenderness: There is no abdominal tenderness.   Musculoskeletal:         General: Normal range of motion.      Cervical back: Normal range of motion.      Right lower leg: No edema.      Left lower leg: No edema.   Lymphadenopathy:      Cervical: No cervical adenopathy.   Skin:     General: Skin is warm and dry.      Capillary Refill: Capillary refill takes less than 2 seconds.   Neurological:      General: No focal deficit present.      Mental Status:  She is alert and oriented to person, place, and time.   Psychiatric:         Mood and Affect: Mood normal.         Behavior: Behavior normal.         Thought Content: Thought content normal.         Judgment: Judgment normal.          PAT AIRWAY:   Airway:     Mallampati::  III    Neck ROM::  Full  normal        Visit Vitals  /80   Pulse 84   Temp 36.8 °C (98.3 °F)       DASI Risk Score      Flowsheet Row Pre-Admission Testing from 10/9/2024 in [facility] Questionnaire Series Submission from 9/5/2024 in [facility] with Generic Provider Shira   Can you take care of yourself (eat, dress, bathe, or use toilet)?  2.75 filed at 10/09/2024 1504 2.75  filed at 09/05/2024 1453   Can you walk indoors, such as around your house? 1.75 filed at 10/09/2024 1504 1.75  filed at 09/05/2024 1453   Can you walk a block or two on level ground?  2.75 filed at 10/09/2024 1504 2.75  filed at 09/05/2024 1453   Can you climb a flight of stairs or walk up a hill? 5.5 filed at 10/09/2024 1504 5.5  filed at 09/05/2024 1453   Can you run a short distance? 8 filed at 10/09/2024 1504 8  filed at 09/05/2024 1453   Can you do light work around the house like dusting or washing dishes? 2.7 filed at 10/09/2024 1504 2.7  filed at 09/05/2024 1453   Can you do moderate work around the house like vacuuming, sweeping floors or carrying groceries? 3.5 filed at 10/09/2024 1504 3.5  filed at 09/05/2024 1453   Can you do heavy work around the house like scrubbing floors or lifting and moving heavy furniture?  8 filed at 10/09/2024 1504 8  filed at 09/05/2024 1453   Can you do yard work like raking leaves, weeding or pushing a mower? 4.5 filed at 10/09/2024 1504 4.5  filed at 09/05/2024 1453   Can you have sexual relations? 5.25 filed at 10/09/2024 1504 5.25  filed at 09/05/2024 1453   Can you participate in moderate recreational activities like golf, bowling, dancing, doubles tennis or throwing a baseball or football? 6 filed at  10/09/2024 1504 6  filed at 09/05/2024 1453   Can you participate in strenous sports like swimming, singles tennis, football, basketball, or skiing? 7.5 filed at 10/09/2024 1504 7.5  filed at 09/05/2024 1453   DASI SCORE 58.2 filed at 10/09/2024 1504 58.2  filed at 09/05/2024 1453   METS Score (Will be calculated only when all the questions are answered) 9.9 filed at 10/09/2024 1504 9.9  filed at 09/05/2024 1453          Caprini DVT Assessment      Flowsheet Row Pre-Admission Testing from 10/9/2024 in [facility]   DVT Score 5 filed at 10/09/2024 1529   Surgical Factors Major surgery planned, lasting 2-3 hours filed at 10/09/2024 1529   Women Oral contraceptives filed at 10/09/2024 1529   BMI 30 or less filed at 10/09/2024 1529          Modified Frailty Index      Flowsheet Row Pre-Admission Testing from 10/9/2024 in [facility]   Non-independent functional status (problems with dressing, bathing, personal grooming, or cooking) 0 filed at 10/09/2024 1530   History of diabetes mellitus  0 filed at 10/09/2024 1530   History of COPD 0 filed at 10/09/2024 1530   History of CHF No filed at 10/09/2024 1530   History of MI 0 filed at 10/09/2024 1530   History of Percutaneous Coronary Intervention, Cardiac Surgery, or Angina No filed at 10/09/2024 1530   Hypertension requiring the use of medication  0 filed at 10/09/2024 1530   Peripheral vascular disease 0 filed at 10/09/2024 1530   Impaired sensorium (cognitive impairement or loss, clouding, or delirium) 0 filed at 10/09/2024 1530   TIA or CVA withouy residual deficit 0 filed at 10/09/2024 1530   Cerebrovascular accident with deficit 0 filed at 10/09/2024 1530   Modified Frailty Index Calculator 0 filed at 10/09/2024 1530          CHADS2 Stroke Risk  Current as of about an hour ago        N/A 3 to 100%: High Risk   2 to < 3%: Medium Risk   0 to < 2%: Low Risk     Last Change: N/A          This score determines the patient's risk of having a  stroke if the patient has atrial fibrillation.        This score is not applicable to this patient. Components are not calculated.          Revised Cardiac Risk Index      Flowsheet Row Pre-Admission Testing from 10/9/2024 in [facility]3]   High-Risk Surgery (Intraperitoneal, Intrathoracic,Suprainguinal vascular) 1 filed at 10/09/2024 1530   History of ischemic heart disease (History of MI, History of positive exercuse test, Current chest paint considered due to myocardial ischemia, Use of nitrate therapy, ECG with pathological Q Waves) 0 filed at 10/09/2024 1530   History of congestive heart failure (pulmonary edemia, bilateral rales or S3 gallop, Paroxysmal nocturnal dyspnea, CXR showing pulmonary vascular redistribution) 0 filed at 10/09/2024 1530   History of cerebrovascular disease (Prior TIA or stroke) 0 filed at 10/09/2024 1530   Pre-operative insulin treatment 0 filed at 10/09/2024 1530   Pre-operative creatinine>2 mg/dl 0 filed at 10/09/2024 1530   Revised Cardiac Risk Calculator 1 filed at 10/09/2024 1530          Apfel Simplified Score      Flowsheet Row Pre-Admission Testing from 10/9/2024 in [facility]3]   Smoking status 1 filed at 10/09/2024 1530   History of motion sickness or PONV  1 filed at 10/09/2024 1530   Use of postoperative opioids 1 filed at 10/09/2024 1530   Gender - Female 1=Yes filed at 10/09/2024 1530   Apfel Simplified Score Calculator 4 filed at 10/09/2024 1530          Risk Analysis Index Results This Encounter    No data found in the last 10 encounters.       Stop Bang Score      Flowsheet Row Pre-Admission Testing from 10/9/2024 in [facility]3] Questionnaire Series Submission from 9/5/2024 in [facility] with Generic Provider Shira   Do you snore loudly? 0 filed at 10/09/2024 1504 0  filed at 09/05/2024 1453   Do you often feel tired or fatigued after your sleep? 0 filed at 10/09/2024 1504 0  filed at 09/05/2024 1453   Has anyone ever  observed you stop breathing in your sleep? 0 filed at 10/09/2024 1504 0  filed at 09/05/2024 1453   Do you have or are you being treated for high blood pressure? 0 filed at 10/09/2024 1504 0  filed at 09/05/2024 1453   Recent BMI (Calculated) 23 filed at 10/09/2024 1504 23  filed at 09/05/2024 1453   Is BMI greater than 35 kg/m2? 0=No filed at 10/09/2024 1504 0=No  filed at 09/05/2024 1453   Age older than 50 years old? 0=No filed at 10/09/2024 1504 0=No  filed at 09/05/2024 1453   Is your neck circumference greater than 17 inches (Male) or 16 inches (Female)? 1 filed at 10/09/2024 1504 --   Gender - Male 0=No filed at 10/09/2024 1504 0=No  filed at 09/05/2024 1453   STOP-BANG Total Score 1 filed at 10/09/2024 1504 --            Assessment and Plan:   Neuro: Anxiety, depression and bipolar 2 disorder managed without the use of medications at this time.    The patient is at an increased risk for post operative delirium secondary to depression and type and duration of surgery.  Preoperative brain exercise educational handout provided to patient.    The patient is at an increased risk for perioperative stroke secondary to HLD, female sex , and general anesthesia.    HEENT/Airway:  No diagnosis or significant findings on chart review or clinical presentation and evaluation.    Cardiovascular: Hyperlipidemia managed on atorvastatin (continue).  Palpitations worked up by cardiology and found to be PVCs.    EKG 09/21/2023 sinus rhythm-scanned into media tab    Holter monitor 10/20/2023 - 11/03/2023-scanned into media tab  predominant rhythm was sinus bradycardia to sinus tachycardia with frequent ventricular ectopy.  The maximum heart rate recorded was 163 bpm and the minimum heart rate recorded was 44 bpm with an average heart rate of 76 bpm.  There were 97,115 VE beats with a burden of 7%.  There were 4 occurrences of ventricular tachycardia with the longest episode 3 beats and the fastest episode of 115 bpm.  There were  48 SVE beats with a burden of <1%.  There were 14 patient triggers.    TTE 12/15/23  CONCLUSIONS:   1. Left ventricular systolic function is normal with a 60-65% estimated ejection fraction.   2. There is no evidence of left ventricular hypertrophy.    Patient saw cardiology, Dr. Godwin Malave, on 10/20/2023 in regards to occasional palpitations with unclear etiology.  No treatment necessary and follow-up as needed.    No additional preoperative testing is currently indicated.    METS are 9.9    RCRI  1 which is 6% 30 day risk of MACE (risk for cardiac death, nonfatal myocardial infarction, and nonfactal cardiac arrest    ASHANTI score which indicates a 0.1% risk of intraoperative or 30-day postoperative MACE      Pulmonary:  No diagnosis or significant findings on chart review or clinical presentation and evaluation however see below risk screening tools.  Preoperative deep breathing educational handout provided to patient.    ARISCAT:  16    points which is a low (1.6%) risk of in-hospital post-op pulmonary complications     PRODIGY:  0  points which is a low risk of post op opioid induced respiratory depression episodes    STOP BAN    points which is a low risk for moderate to severe SHAYE    Renal: No diagnosis or significant findings on chart review or clinical presentation and evaluation, however, the patient is at increased risk of perioperative renal complications secondary to intraperitoneal surgery. Preventative measures include preoperative hydration.    Endocrine:  No diagnosis or significant findings on chart review or clinical presentation and evaluation.    Hematologic:   No diagnosis or significant findings on chart review or clinical presentation and evaluation however see below risk screening tool.  Preoperative DVT educational handout provided to patient.    Caprini Score:  5  points which is a highest risk of perioperative VTE    Gastrointestinal:  No diagnosis or significant findings on chart  review or clinical presentation and evaluation.    EAT-10 score of   0   - self-perceived oropharyngeal dysphagia scale (0-40)     Apfel: 4 points 79% risk for post operative N/V    Infectious disease:  No diagnosis or significant findings on chart review or clinical presentation and evaluation.     Musculoskeletal:  No diagnosis or significant findings on chart review or clinical presentation and evaluation.        Labs ordered  Results for orders placed or performed in visit on 10/09/24 (from the past 96 hour(s))   CBC   Result Value Ref Range    WBC 8.5 4.4 - 11.3 x10*3/uL    nRBC 0.0 0.0 - 0.0 /100 WBCs    RBC 4.67 4.00 - 5.20 x10*6/uL    Hemoglobin 14.5 12.0 - 16.0 g/dL    Hematocrit 43.7 36.0 - 46.0 %    MCV 94 80 - 100 fL    MCH 31.0 26.0 - 34.0 pg    MCHC 33.2 32.0 - 36.0 g/dL    RDW 11.9 11.5 - 14.5 %    Platelets 399 150 - 450 x10*3/uL   Basic Metabolic Panel   Result Value Ref Range    Glucose 88 74 - 99 mg/dL    Sodium 137 136 - 145 mmol/L    Potassium 5.0 3.5 - 5.3 mmol/L    Chloride 102 98 - 107 mmol/L    Bicarbonate 25 21 - 32 mmol/L    Anion Gap 15 10 - 20 mmol/L    Urea Nitrogen 14 6 - 23 mg/dL    Creatinine 0.73 0.50 - 1.05 mg/dL    eGFR >90 >60 mL/min/1.73m*2    Calcium 9.9 8.6 - 10.6 mg/dL   Type And Screen   Result Value Ref Range    ABO TYPE A     Rh TYPE POS     ANTIBODY SCREEN NEG

## 2024-10-09 NOTE — PREPROCEDURE INSTRUCTIONS
Thank you for visiting [facility] ([facility]) today for your pre-procedure evaluation, you were seen by     Samantha Meeson, MSN, NP-C  Adult-Gerontology Nurse Practitioner II  Department of Anesthesiology and Perioperative Medicine  Main phone [phone]  Direct phone [phone]  Fax [phone]     This summary includes instructions and information to aid you during your perioperative period.  Please read carefully. If you have any questions about your visit today, please call the number listed above.  If you become ill or have any changes to your health before your surgery, please contact your primary care provider and alert your surgeon.    Preparing for your Surgery       Exercises  Preoperative Deep Breathing Exercises  Why it is important to do deep breathing exercises before my surgery?  Deep breathing exercises strengthen your breathing muscles.  This helps you to recover after your surgery and decreases the chance of breathing complications.  How are the deep breathing exercises done?  Sit straight with your back supported.  Breathe in deeply and slowly through your nose. Your lower rib cage should expand and your abdomen may move forward.  Hold that breath for 3 to 5 seconds.  Breathe out through pursed lips, slowly and completely.  Rest and repeat 10 times every hour while awake.  Rest longer if you become dizzy or lightheaded.       Incentive Spirometer   You were provided with an incentive spirometer in [facility], please follow the below instructions.   You were not provided an incentive spirometer in [facility], please disregard the incentive spirometer instructions  What is an incentive spirometer?  An incentive spirometer is a device used before and after surgery to “exercise” your lungs.  It helps you to take deeper breaths to expand your lungs.  Below is an example of a basic incentive spirometer.  The device you receive may differ slightly but they all function the  same.    Why do I need to use an incentive spirometer?  Using your incentive spirometer prepares your lungs for surgery and helps prevent lung problems after surgery.  How do I use my incentive spirometer?  When you're using your incentive spirometer, make sure to breathe through your mouth. If you breathe through your nose, the incentive spirometer won't work properly. You can hold your nose if you have trouble.  If you feel dizzy at any time, stop and rest. Try again at a later time.  Follow the steps below:  Set up your incentive spirometer, expand the flexible tubing and connect to the outlet.  Sit upright in a chair or bed. Hold the incentive spirometer at eye level.   Put the mouthpiece in your mouth and close your lips tightly around it. Slowly breathe out (exhale) completely.  Breathe in (inhale) slowly through your mouth as deeply as you can. As you take a breath, you will see the piston rise inside the large column. While the piston rises, the indicator should move upwards. It should stay in between the 2 arrows (see Figure).  Try to get the piston as high as you can, while keeping the indicator between the arrows.   If the indicator doesn't stay between the arrows, you're breathing either too fast or too slow.  When you get it as high as you can, hold your breath for 10 seconds, or as long as possible. While you're holding your breath, the piston will slowly fall to the base of the spirometer.  Once the piston reaches the bottom of the spirometer, breathe out slowly through your mouth. Rest for a few seconds.  Repeat 10 times. Try to get the piston to the same level with each breath.  Repeat every hour while awake  You can carefully clean the outside of the mouthpiece with an alcohol wipe or soap and water.      Preoperative Brain Exercises    What are brain exercises?  A brain exercise is any activity that engages your thinking (cognitive) skills.    What types of activities are considered brain  exercises?  Jigsaw puzzles, crossword puzzles, word jumble, memory games, word search, and many more.  Many can be found free online or on your phone via a mobile rosmery.    Why should I do brain exercises before my surgery?  More recent research has shown brain exercise before surgery can lower the risk of postoperative delirium (confusion) which can be especially important for older adults.  Patients who did brain exercises for 5 to 10 hours the days before surgery, cut their risk of postoperative delirium in half up to 1 week after surgery.    Sit-to-Stand Exercise    What is the sit-to-stand exercise?  The sit-to-stand exercise strengthens the muscles of your lower body and muscles in the center of your body (core muscles for stability) helping to maintain and improve your strength and mobility.  How do I do the sit-to-stand exercise?  The goal is to do this exercise without using your arms or hands.  If this is too difficult, use your arms and hands or a chair with armrests to help slowly push yourself to the standing position and lower yourself back to the sitting position. As the movement becomes easier use your arms and hands less.    Steps to the sit-to-stand exercise  Sit up tall in a sturdy chair, knees bent, feet flat on the floor shoulder-width apart.  Shift your hips/pelvis forward in the chair to correctly position yourself for the next movement.  Lean forward at your hips.  Stand up straight putting equal weight on both feet.  Check to be sure you are properly aligned with the chair, in a slow controlled movement sit back down.  Repeat this exercise 10-15 times.  If needed you can do it fewer times until your strength improves.  Rest for 1 minute.  Do another 10-15 sit-to-stand exercises.  Try to do this in the morning and evening.        Instructions    Preoperative Fasting Guidelines    Why must I stop eating and drinking near surgery time?  With sedation, food or liquid in your stomach can enter your  lungs causing serious complications  Food can increase nausea and vomiting  When do I need to stop eating and drinking before my surgery?      Do not eat any food after midnight the night before your surgery/procedure. You may have up to 13.5 ounces of clear liquid until TWO hours before your instructed arrival time to the hospital.  This includes water, black tea/coffee, (no milk or cream) apple juice, and electrolyte drinks (Gatorade). You may chew gum until TWO hours before your surgery/procedure            Simple things you can do to help prevent blood clots     Blood clots are blockages that can form in the body's veins. When a blood clot forms in your deep veins, it may be called a deep vein thrombosis, or DVT for short. Blood clots can happen in any part of the body where blood flows, but they are most common in the arms and legs. If a piece of a blood clot breaks free and travels to the lungs, it is called a pulmonary embolus (PE). A PE can be a very serious problem.         Being in the hospital or having surgery can raise your chances of getting a blood clot because you may not be well enough to move around as much as you normally do.         Ways you can help prevent blood clots in the hospital       Wearing SCDs  SCDs stands for Sequential Compression Devices.   SCDs are special sleeves that wrap around your legs. They attach to a pump that fills them with air to gently squeeze your legs every few minutes.  This helps return the blood in your legs to your heart.   SCDs should only be taken off when walking or bathing. SCDs may not be comfortable, but they can help save your life.              Pump SCD leg sleeves  Wearing compression stockings - if your doctor orders them. These special snug-fitting stockings gently squeeze your legs to help blood flow.       Walking. Walking helps move the blood in your legs.   If your doctor says it is ok, try walking the halls at least   5 times a day. Ask us to help  you get up, so you don't fall.      Taking any blood-thinning medicines your doctor orders.              Ways you can help prevent blood clots at home         Wearing compression stockings - if your doctor orders them.   Walking - to help move the blood in your legs.    Taking any blood-thinning medicines your doctor orders.      Signs of a blood clot or PE    Tell your doctor or nurse right away if you have any of the problems listed below.         If you are at home, seek medical care right away. Call 911 for chest pain or problems breathing.            Signs of a blood clot (DVT) - such as pain, swelling, redness, or warmth in your arm or legs.  Signs of a pulmonary embolism (PE) - such as chest pain or feeling short of breath      Tobacco and Alcohol;  Do not drink alcohol or smoke within 24 hours of surgery.  It is best to quit smoking for as long as possible before any surgery or procedure.      The Week before Surgery        Seven days before Surgery  Check your [facility] medication instructions  Do the exercises provided to you by [facility]   Arrange for a responsible, adult licensed  to take you home after surgery and stay with you for 24 hours.  You will not be permitted to drive yourself home if you have received any anesthetic/sedation  Six days before surgery  Check your [facility] medication instructions  Do the exercises provided to you by [facility]   Start using Chlorhexidene (CHG) body wash if prescribed  Five days before surgery  Check your [facility] medication instructions  Do the exercises provided to you by [facility]   Continue to use CHG body wash if prescribed  Three days before surgery  Check your [facility] medication instructions  Do the exercises provided to you by [facility]   Continue to use CHG body wash if prescribed  Two days before surgery  Check your [facility] medication instructions  Do the exercises provided to you by [facility]   Continue to use CHG body wash if prescribed    The Day before Surgery       Check your [facility] medication and  all other [facility] instructions including when to stop eating and drinking  You will be called with your arrival time for surgery in the late afternoon.  If you do not receive a call please reach out to your surgeon's office.  Do not smoke or drink 24 hours before surgery  Prepare items to bring with you to the hospital  Shower with your chlorhexidine wash if prescribed  Brush your teeth and use your chlorhexidine dental rinse if prescribed    The Day of Surgery       Check your [facility] medication instructions  Ensure you follow the instructions for when to stop eating and drinking  Shower, if prescribed use CHG.  Do not apply any lotions, creams, moisturizers, perfume or deodorant  Brush your teeth and use your CHG dental rinse if prescribed  Wear loose comfortable clothing  Avoid make-up  Remove  jewelry and piercings, consider professional piercing removal with a plastic spacer if needed  Bring photo ID and Insurance card  Bring an accurate medication list that includes medication dose, frequency and allergies  Bring a copy of your advanced directives (will, health care power of )  Bring any devices and controllers as well as medical devices you have been provided with for surgery (CPAP, slings, braces, etc.)  Dentures, eyeglasses, and contacts will be removed before surgery, please bring cases for contacts or glasses

## 2024-10-09 NOTE — PROGRESS NOTES
Division of Minimally Invasive Gynecologic Surgery  [facility]    10/09/24 Gynecology Visit - Virtual Telephone Visit     CC: Follow up pelvic pain     Meredith Mckenzie is a 29 y.o. P0 planning for robotic excision of endometriosis, possible appendectomy on 10/24/24 presents to review finding of adenomyosis on MRI. She has no desire for future fertility and is considering hysterectomy.       She has tried:  - Cymbalta, Prozac, Trazodone, Risperdal - she's tried multiple anti-depressants, anti-anxiety medications, and mood stabilizers   - Lidocaine jelly: helpful a little bit   - Vibrators/digits for dilation  - Flexeril: Significant sedation      She has not tried:   - Pelvic floor TPI  - Pelvic floor Botox      Imaging:   - MRI pelvis showed uterus measuring 8cm x 4cm x 6cm, suspicious for adenomyosis. Otherwise unremarkable.   - Pelvic US 2/2022 showed uterus measuring 7cm x 2cm x 3cm. Bilateral ovaries WNL.   Screening:   - Last pap 2022 WNL per pt, no hx of CIN2-3  PMHx: depression/anxiety, BD2, HLD, cystic acne, palpitations (s/p normal Cards consult, PVC's)  PSHx: wisdom teeth, adenoids, ear tubes     ROS: reviewed and negative    PE: Virtual Telephone Visit     A/P: Meredith Mckenzie is a 29 y.o. P0 planning for robotic excision of endometriosis, possible appendectomy on 10/24/24 presents to review finding of adenomyosis on MRI. She has no desire for future fertility and is considering hysterectomy.   - Reviewed etiology of adenomyosis and treatment options, including hysterectomy. We reviewed the typical procedure for hysterectomy. She is interested in proceeding w/ hysterectomy. She does understand that fertility following hysterectomy is not an option.   - Will add total robotic hysterectomy, bilateral salpingectomy to planned excision of endometriosis, possible appendectomy.       Claritza Fenton MD  Division of Minimally Invasive Gynecologic Surgery  [place]  [facility], [facility]

## 2024-10-23 ENCOUNTER — ANESTHESIA EVENT (OUTPATIENT)
Dept: OPERATING ROOM | Facility: HOSPITAL | Age: 29
End: 2024-10-23
Payer: COMMERCIAL

## 2024-10-23 NOTE — HOSPITAL COURSE
28 yo P0 with dysmenorrhea, vaginismus, vulvodynia, and high tone pelvic floor dysfunction presenting for robotic excision of endometriosis, total robotic hysterectomy, bilateral salpingectomy, possible appendectomy, any other indicated procedures     Imaging:   - Pelvic US 2/2022 showed uterus measuring 7cm x 2cm x 3cm. Bilateral ovaries WNL.   Screening:   - Last pap 2022 WNL per pt, no hx of CIN2-3  PMHx: depression/anxiety, BD2, HLD, cystic acne, palpitations (s/p normal Cards consult, PVC's)  PSHx: wisdom teeth, adenoids, ear tubes     Labs: (10/9/24) Hgb 14.5, Cr 0.73

## 2024-10-24 ENCOUNTER — HOSPITAL ENCOUNTER (OUTPATIENT)
Facility: HOSPITAL | Age: 29
Setting detail: OUTPATIENT SURGERY
Discharge: HOME | End: 2024-10-24
Attending: STUDENT IN AN ORGANIZED HEALTH CARE EDUCATION/TRAINING PROGRAM | Admitting: STUDENT IN AN ORGANIZED HEALTH CARE EDUCATION/TRAINING PROGRAM
Payer: COMMERCIAL

## 2024-10-24 ENCOUNTER — ANESTHESIA (OUTPATIENT)
Dept: OPERATING ROOM | Facility: HOSPITAL | Age: 29
End: 2024-10-24
Payer: COMMERCIAL

## 2024-10-24 VITALS
RESPIRATION RATE: 16 BRPM | WEIGHT: 143.96 LBS | SYSTOLIC BLOOD PRESSURE: 115 MMHG | OXYGEN SATURATION: 99 % | TEMPERATURE: 97 F | BODY MASS INDEX: 23.14 KG/M2 | HEART RATE: 65 BPM | DIASTOLIC BLOOD PRESSURE: 68 MMHG | HEIGHT: 66 IN

## 2024-10-24 DIAGNOSIS — N80.9 ENDOMETRIOSIS: Primary | ICD-10-CM

## 2024-10-24 DIAGNOSIS — R52 POSTPARTUM PAIN (HHS-HCC): ICD-10-CM

## 2024-10-24 DIAGNOSIS — G89.18 POSTOPERATIVE PAIN: ICD-10-CM

## 2024-10-24 PROBLEM — Z98.890 PONV (POSTOPERATIVE NAUSEA AND VOMITING): Status: ACTIVE | Noted: 2024-10-24

## 2024-10-24 PROBLEM — R11.2 PONV (POSTOPERATIVE NAUSEA AND VOMITING): Status: ACTIVE | Noted: 2024-10-24

## 2024-10-24 LAB
ABO GROUP (TYPE) IN BLOOD: NORMAL
ANION GAP BLDV CALCULATED.4IONS-SCNC: 14 MMOL/L (ref 10–25)
ANION GAP BLDV CALCULATED.4IONS-SCNC: 8 MMOL/L (ref 10–25)
ANTIBODY SCREEN: NORMAL
BASE EXCESS BLDV CALC-SCNC: -1.1 MMOL/L (ref -2–3)
BASE EXCESS BLDV CALC-SCNC: -1.5 MMOL/L (ref -2–3)
BODY TEMPERATURE: 37 DEGREES CELSIUS
BODY TEMPERATURE: 37 DEGREES CELSIUS
CA-I BLDV-SCNC: 1.17 MMOL/L (ref 1.1–1.33)
CA-I BLDV-SCNC: 1.23 MMOL/L (ref 1.1–1.33)
CHLORIDE BLDV-SCNC: 102 MMOL/L (ref 98–107)
CHLORIDE BLDV-SCNC: 104 MMOL/L (ref 98–107)
GLUCOSE BLDV-MCNC: 111 MG/DL (ref 74–99)
GLUCOSE BLDV-MCNC: 90 MG/DL (ref 74–99)
HCO3 BLDV-SCNC: 23.7 MMOL/L (ref 22–26)
HCO3 BLDV-SCNC: 24.8 MMOL/L (ref 22–26)
HCT VFR BLD EST: 37 % (ref 36–46)
HCT VFR BLD EST: 38 % (ref 36–46)
HGB BLDV-MCNC: 12.3 G/DL (ref 12–16)
HGB BLDV-MCNC: 12.6 G/DL (ref 12–16)
INHALED O2 CONCENTRATION: 50 %
INHALED O2 CONCENTRATION: 50 %
LACTATE BLDV-SCNC: 1.2 MMOL/L (ref 0.4–2)
LACTATE BLDV-SCNC: 1.7 MMOL/L (ref 0.4–2)
OXYHGB MFR BLDV: 80.8 % (ref 45–75)
OXYHGB MFR BLDV: 96 % (ref 45–75)
PCO2 BLDV: 41 MM HG (ref 41–51)
PCO2 BLDV: 45 MM HG (ref 41–51)
PH BLDV: 7.35 PH (ref 7.33–7.43)
PH BLDV: 7.37 PH (ref 7.33–7.43)
PO2 BLDV: 52 MM HG (ref 35–45)
PO2 BLDV: 83 MM HG (ref 35–45)
POTASSIUM BLDV-SCNC: 3.8 MMOL/L (ref 3.5–5.3)
POTASSIUM BLDV-SCNC: 3.9 MMOL/L (ref 3.5–5.3)
PREGNANCY TEST URINE, POC: NEGATIVE
RH FACTOR (ANTIGEN D): NORMAL
SAO2 % BLDV: 82 % (ref 45–75)
SAO2 % BLDV: 98 % (ref 45–75)
SODIUM BLDV-SCNC: 133 MMOL/L (ref 136–145)
SODIUM BLDV-SCNC: 136 MMOL/L (ref 136–145)

## 2024-10-24 PROCEDURE — 58662 LAPAROSCOPY EXCISE LESIONS: CPT | Performed by: STUDENT IN AN ORGANIZED HEALTH CARE EDUCATION/TRAINING PROGRAM

## 2024-10-24 PROCEDURE — 2720000007 HC OR 272 NO HCPCS: Performed by: STUDENT IN AN ORGANIZED HEALTH CARE EDUCATION/TRAINING PROGRAM

## 2024-10-24 PROCEDURE — 2500000004 HC RX 250 GENERAL PHARMACY W/ HCPCS (ALT 636 FOR OP/ED)

## 2024-10-24 PROCEDURE — 58571 TLH W/T/O 250 G OR LESS: CPT | Performed by: STUDENT IN AN ORGANIZED HEALTH CARE EDUCATION/TRAINING PROGRAM

## 2024-10-24 PROCEDURE — S2900 ROBOTIC SURGICAL SYSTEM: HCPCS | Performed by: STUDENT IN AN ORGANIZED HEALTH CARE EDUCATION/TRAINING PROGRAM

## 2024-10-24 PROCEDURE — 36415 COLL VENOUS BLD VENIPUNCTURE: CPT

## 2024-10-24 PROCEDURE — 88305 TISSUE EXAM BY PATHOLOGIST: CPT | Performed by: STUDENT IN AN ORGANIZED HEALTH CARE EDUCATION/TRAINING PROGRAM

## 2024-10-24 PROCEDURE — 86901 BLOOD TYPING SEROLOGIC RH(D): CPT | Performed by: STUDENT IN AN ORGANIZED HEALTH CARE EDUCATION/TRAINING PROGRAM

## 2024-10-24 PROCEDURE — 2500000004 HC RX 250 GENERAL PHARMACY W/ HCPCS (ALT 636 FOR OP/ED): Performed by: STUDENT IN AN ORGANIZED HEALTH CARE EDUCATION/TRAINING PROGRAM

## 2024-10-24 PROCEDURE — 3700000002 HC GENERAL ANESTHESIA TIME - EACH INCREMENTAL 1 MINUTE: Performed by: STUDENT IN AN ORGANIZED HEALTH CARE EDUCATION/TRAINING PROGRAM

## 2024-10-24 PROCEDURE — 88307 TISSUE EXAM BY PATHOLOGIST: CPT | Mod: TC,SUR | Performed by: STUDENT IN AN ORGANIZED HEALTH CARE EDUCATION/TRAINING PROGRAM

## 2024-10-24 PROCEDURE — 2500000001 HC RX 250 WO HCPCS SELF ADMINISTERED DRUGS (ALT 637 FOR MEDICARE OP): Performed by: STUDENT IN AN ORGANIZED HEALTH CARE EDUCATION/TRAINING PROGRAM

## 2024-10-24 PROCEDURE — 81025 URINE PREGNANCY TEST: CPT | Performed by: STUDENT IN AN ORGANIZED HEALTH CARE EDUCATION/TRAINING PROGRAM

## 2024-10-24 PROCEDURE — 7100000001 HC RECOVERY ROOM TIME - INITIAL BASE CHARGE: Performed by: STUDENT IN AN ORGANIZED HEALTH CARE EDUCATION/TRAINING PROGRAM

## 2024-10-24 PROCEDURE — 2780000003 HC OR 278 NO HCPCS: Performed by: STUDENT IN AN ORGANIZED HEALTH CARE EDUCATION/TRAINING PROGRAM

## 2024-10-24 PROCEDURE — 7100000009 HC PHASE TWO TIME - INITIAL BASE CHARGE: Performed by: STUDENT IN AN ORGANIZED HEALTH CARE EDUCATION/TRAINING PROGRAM

## 2024-10-24 PROCEDURE — 84132 ASSAY OF SERUM POTASSIUM: CPT

## 2024-10-24 PROCEDURE — 2500000005 HC RX 250 GENERAL PHARMACY W/O HCPCS

## 2024-10-24 PROCEDURE — 3600000018 HC OR TIME - INITIAL BASE CHARGE - PROCEDURE LEVEL SIX: Performed by: STUDENT IN AN ORGANIZED HEALTH CARE EDUCATION/TRAINING PROGRAM

## 2024-10-24 PROCEDURE — 2500000004 HC RX 250 GENERAL PHARMACY W/ HCPCS (ALT 636 FOR OP/ED): Performed by: ANESTHESIOLOGY

## 2024-10-24 PROCEDURE — 82435 ASSAY OF BLOOD CHLORIDE: CPT

## 2024-10-24 PROCEDURE — 3600000017 HC OR TIME - EACH INCREMENTAL 1 MINUTE - PROCEDURE LEVEL SIX: Performed by: STUDENT IN AN ORGANIZED HEALTH CARE EDUCATION/TRAINING PROGRAM

## 2024-10-24 PROCEDURE — 7100000010 HC PHASE TWO TIME - EACH INCREMENTAL 1 MINUTE: Performed by: STUDENT IN AN ORGANIZED HEALTH CARE EDUCATION/TRAINING PROGRAM

## 2024-10-24 PROCEDURE — 3700000001 HC GENERAL ANESTHESIA TIME - INITIAL BASE CHARGE: Performed by: STUDENT IN AN ORGANIZED HEALTH CARE EDUCATION/TRAINING PROGRAM

## 2024-10-24 PROCEDURE — 7100000002 HC RECOVERY ROOM TIME - EACH INCREMENTAL 1 MINUTE: Performed by: STUDENT IN AN ORGANIZED HEALTH CARE EDUCATION/TRAINING PROGRAM

## 2024-10-24 PROCEDURE — 88307 TISSUE EXAM BY PATHOLOGIST: CPT | Performed by: STUDENT IN AN ORGANIZED HEALTH CARE EDUCATION/TRAINING PROGRAM

## 2024-10-24 RX ORDER — LIDOCAINE HYDROCHLORIDE 20 MG/ML
INJECTION, SOLUTION INFILTRATION; PERINEURAL AS NEEDED
Status: DISCONTINUED | OUTPATIENT
Start: 2024-10-24 | End: 2024-10-24

## 2024-10-24 RX ORDER — HYDROMORPHONE HYDROCHLORIDE 1 MG/ML
INJECTION, SOLUTION INTRAMUSCULAR; INTRAVENOUS; SUBCUTANEOUS AS NEEDED
Status: DISCONTINUED | OUTPATIENT
Start: 2024-10-24 | End: 2024-10-24

## 2024-10-24 RX ORDER — SENNOSIDES 8.6 MG/1
1 TABLET ORAL 2 TIMES DAILY
Qty: 14 TABLET | Refills: 0 | Status: SHIPPED | OUTPATIENT
Start: 2024-10-24 | End: 2024-10-31

## 2024-10-24 RX ORDER — ONDANSETRON 4 MG/1
4 TABLET, ORALLY DISINTEGRATING ORAL EVERY 8 HOURS PRN
Qty: 20 TABLET | Refills: 0 | Status: SHIPPED | OUTPATIENT
Start: 2024-10-24 | End: 2024-10-24

## 2024-10-24 RX ORDER — OXYCODONE HYDROCHLORIDE 5 MG/1
10 TABLET ORAL EVERY 4 HOURS PRN
Status: DISCONTINUED | OUTPATIENT
Start: 2024-10-24 | End: 2024-10-24 | Stop reason: HOSPADM

## 2024-10-24 RX ORDER — OXYCODONE HYDROCHLORIDE 5 MG/1
5 TABLET ORAL EVERY 6 HOURS PRN
Qty: 12 TABLET | Refills: 0 | Status: SHIPPED | OUTPATIENT
Start: 2024-10-24 | End: 2024-10-24

## 2024-10-24 RX ORDER — ACETAMINOPHEN 325 MG/1
650 TABLET ORAL EVERY 4 HOURS PRN
Status: DISCONTINUED | OUTPATIENT
Start: 2024-10-24 | End: 2024-10-24 | Stop reason: HOSPADM

## 2024-10-24 RX ORDER — SODIUM CHLORIDE, SODIUM LACTATE, POTASSIUM CHLORIDE, CALCIUM CHLORIDE 600; 310; 30; 20 MG/100ML; MG/100ML; MG/100ML; MG/100ML
20 INJECTION, SOLUTION INTRAVENOUS CONTINUOUS
Status: DISCONTINUED | OUTPATIENT
Start: 2024-10-24 | End: 2024-10-24 | Stop reason: HOSPADM

## 2024-10-24 RX ORDER — PROPOFOL 10 MG/ML
INJECTION, EMULSION INTRAVENOUS AS NEEDED
Status: DISCONTINUED | OUTPATIENT
Start: 2024-10-24 | End: 2024-10-24

## 2024-10-24 RX ORDER — HYDROMORPHONE HYDROCHLORIDE 1 MG/ML
0.1 INJECTION, SOLUTION INTRAMUSCULAR; INTRAVENOUS; SUBCUTANEOUS EVERY 5 MIN PRN
Status: DISCONTINUED | OUTPATIENT
Start: 2024-10-24 | End: 2024-10-24 | Stop reason: HOSPADM

## 2024-10-24 RX ORDER — OXYCODONE HYDROCHLORIDE 5 MG/1
5 TABLET ORAL EVERY 6 HOURS PRN
Qty: 12 TABLET | Refills: 0 | Status: SHIPPED | OUTPATIENT
Start: 2024-10-24

## 2024-10-24 RX ORDER — ONDANSETRON 4 MG/1
4 TABLET, ORALLY DISINTEGRATING ORAL EVERY 6 HOURS PRN
Qty: 12 TABLET | Refills: 0 | Status: SHIPPED | OUTPATIENT
Start: 2024-10-24

## 2024-10-24 RX ORDER — ONDANSETRON HYDROCHLORIDE 2 MG/ML
4 INJECTION, SOLUTION INTRAVENOUS ONCE AS NEEDED
Status: COMPLETED | OUTPATIENT
Start: 2024-10-24 | End: 2024-10-24

## 2024-10-24 RX ORDER — BUPIVACAINE HYDROCHLORIDE 5 MG/ML
INJECTION, SOLUTION PERINEURAL AS NEEDED
Status: DISCONTINUED | OUTPATIENT
Start: 2024-10-24 | End: 2024-10-24 | Stop reason: HOSPADM

## 2024-10-24 RX ORDER — HYDROMORPHONE HYDROCHLORIDE 1 MG/ML
0.5 INJECTION, SOLUTION INTRAMUSCULAR; INTRAVENOUS; SUBCUTANEOUS EVERY 5 MIN PRN
Status: DISCONTINUED | OUTPATIENT
Start: 2024-10-24 | End: 2024-10-24 | Stop reason: HOSPADM

## 2024-10-24 RX ORDER — MIDAZOLAM HYDROCHLORIDE 1 MG/ML
INJECTION INTRAMUSCULAR; INTRAVENOUS AS NEEDED
Status: DISCONTINUED | OUTPATIENT
Start: 2024-10-24 | End: 2024-10-24

## 2024-10-24 RX ORDER — METRONIDAZOLE 500 MG/100ML
INJECTION, SOLUTION INTRAVENOUS AS NEEDED
Status: DISCONTINUED | OUTPATIENT
Start: 2024-10-24 | End: 2024-10-24

## 2024-10-24 RX ORDER — SENNOSIDES 8.6 MG/1
1 TABLET ORAL 2 TIMES DAILY
Qty: 60 TABLET | Refills: 0 | Status: SHIPPED | OUTPATIENT
Start: 2024-10-24 | End: 2024-10-24

## 2024-10-24 RX ORDER — LIDOCAINE HYDROCHLORIDE 10 MG/ML
0.1 INJECTION, SOLUTION EPIDURAL; INFILTRATION; INTRACAUDAL; PERINEURAL ONCE
Status: DISCONTINUED | OUTPATIENT
Start: 2024-10-24 | End: 2024-10-24 | Stop reason: HOSPADM

## 2024-10-24 RX ORDER — ONDANSETRON HYDROCHLORIDE 2 MG/ML
INJECTION, SOLUTION INTRAVENOUS AS NEEDED
Status: DISCONTINUED | OUTPATIENT
Start: 2024-10-24 | End: 2024-10-24

## 2024-10-24 RX ORDER — HYDROMORPHONE HYDROCHLORIDE 1 MG/ML
0.2 INJECTION, SOLUTION INTRAMUSCULAR; INTRAVENOUS; SUBCUTANEOUS EVERY 5 MIN PRN
Status: DISCONTINUED | OUTPATIENT
Start: 2024-10-24 | End: 2024-10-24 | Stop reason: HOSPADM

## 2024-10-24 RX ORDER — IBUPROFEN 600 MG/1
600 TABLET ORAL EVERY 6 HOURS PRN
Qty: 80 TABLET | Refills: 0 | Status: SHIPPED | OUTPATIENT
Start: 2024-10-24 | End: 2024-10-24

## 2024-10-24 RX ORDER — OXYCODONE HYDROCHLORIDE 5 MG/1
5 TABLET ORAL EVERY 4 HOURS PRN
Status: DISCONTINUED | OUTPATIENT
Start: 2024-10-24 | End: 2024-10-24 | Stop reason: HOSPADM

## 2024-10-24 RX ORDER — KETOROLAC TROMETHAMINE 30 MG/ML
INJECTION, SOLUTION INTRAMUSCULAR; INTRAVENOUS AS NEEDED
Status: DISCONTINUED | OUTPATIENT
Start: 2024-10-24 | End: 2024-10-24

## 2024-10-24 RX ORDER — ACETAMINOPHEN 500 MG
1000 TABLET ORAL EVERY 6 HOURS
Qty: 120 TABLET | Refills: 0 | Status: SHIPPED | OUTPATIENT
Start: 2024-10-24 | End: 2024-10-24

## 2024-10-24 RX ORDER — ACETAMINOPHEN 325 MG/1
975 TABLET ORAL ONCE
Status: COMPLETED | OUTPATIENT
Start: 2024-10-24 | End: 2024-10-24

## 2024-10-24 RX ORDER — FENTANYL CITRATE 50 UG/ML
INJECTION, SOLUTION INTRAMUSCULAR; INTRAVENOUS AS NEEDED
Status: DISCONTINUED | OUTPATIENT
Start: 2024-10-24 | End: 2024-10-24

## 2024-10-24 RX ORDER — DROPERIDOL 2.5 MG/ML
0.62 INJECTION, SOLUTION INTRAMUSCULAR; INTRAVENOUS ONCE AS NEEDED
Status: DISCONTINUED | OUTPATIENT
Start: 2024-10-24 | End: 2024-10-24 | Stop reason: HOSPADM

## 2024-10-24 RX ORDER — CEFAZOLIN 1 G/1
INJECTION, POWDER, FOR SOLUTION INTRAVENOUS AS NEEDED
Status: DISCONTINUED | OUTPATIENT
Start: 2024-10-24 | End: 2024-10-24

## 2024-10-24 RX ORDER — IBUPROFEN 600 MG/1
600 TABLET ORAL EVERY 6 HOURS
Qty: 28 TABLET | Refills: 0 | Status: SHIPPED | OUTPATIENT
Start: 2024-10-24 | End: 2024-10-25

## 2024-10-24 RX ORDER — GABAPENTIN 600 MG/1
600 TABLET ORAL ONCE
Status: COMPLETED | OUTPATIENT
Start: 2024-10-24 | End: 2024-10-24

## 2024-10-24 RX ORDER — ACETAMINOPHEN 500 MG
1000 TABLET ORAL EVERY 6 HOURS
Qty: 56 TABLET | Refills: 0 | Status: SHIPPED | OUTPATIENT
Start: 2024-10-24

## 2024-10-24 RX ORDER — ROCURONIUM BROMIDE 10 MG/ML
INJECTION, SOLUTION INTRAVENOUS AS NEEDED
Status: DISCONTINUED | OUTPATIENT
Start: 2024-10-24 | End: 2024-10-24

## 2024-10-24 RX ORDER — CELECOXIB 200 MG/1
400 CAPSULE ORAL ONCE
Status: COMPLETED | OUTPATIENT
Start: 2024-10-24 | End: 2024-10-24

## 2024-10-24 RX ORDER — METHOCARBAMOL 100 MG/ML
1000 INJECTION, SOLUTION INTRAMUSCULAR; INTRAVENOUS ONCE
Status: COMPLETED | OUTPATIENT
Start: 2024-10-24 | End: 2024-10-24

## 2024-10-24 SDOH — HEALTH STABILITY: MENTAL HEALTH: CURRENT SMOKER: 0

## 2024-10-24 ASSESSMENT — PAIN SCALES - GENERAL
PAINLEVEL_OUTOF10: 5 - MODERATE PAIN
PAINLEVEL_OUTOF10: 4
PAINLEVEL_OUTOF10: 4
PAINLEVEL_OUTOF10: 3
PAINLEVEL_OUTOF10: 4
PAIN_LEVEL: 2
PAINLEVEL_OUTOF10: 5 - MODERATE PAIN
PAINLEVEL_OUTOF10: 4
PAINLEVEL_OUTOF10: 5 - MODERATE PAIN
PAINLEVEL_OUTOF10: 4

## 2024-10-24 NOTE — ANESTHESIA PROCEDURE NOTES
Peripheral IV  Date/Time: 10/24/2024 1:50 PM      Placement  Needle size: 18 G  Laterality: left  Location: forearm  Local anesthetic: none  Site prep: chlorhexidine  Technique: anatomical landmarks

## 2024-10-24 NOTE — ANESTHESIA PREPROCEDURE EVALUATION
Patient: Meredith Mckenzie    Procedure Information       Date/Time: 10/24/24 1340    Procedures:       Hysterectomy Transvaginal Laparoscopy-Assisted,  with bilateral salpingectomy      robotic excision of endometriosis, possible appendectomy, possible Mirena, any indicated procedure (Pelvis)    Location: [facility] OR 02 / Virtual [facility] OR    Surgeons: Claritza Fenton MD            Relevant Problems   Anesthesia   (+) PONV (postoperative nausea and vomiting)      Neuro   (+) Anticipatory anxiety   (+) Bipolar depression (Multi)   (+) Major depressive disorder, recurrent episode, mild (CMS-HCC)   (+) Social anxiety disorder      GYN   (+) Endometriosis       Clinical information reviewed:   Tobacco  Allergies  Meds   Med Hx  Surg Hx   Fam Hx  Soc Hx        NPO Detail:  NPO/Void Status  Carbohydrate Drink Given Prior to Surgery? : N  Date of Last Liquid: 10/23/24  Time of Last Liquid: 2300  Date of Last Solid: 10/23/24  Time of Last Solid: 2300  Last Intake Type: Clear fluids  Time of Last Void: 1251         Physical Exam    Airway  Mallampati: II  TM distance: >3 FB  Neck ROM: full     Cardiovascular   Rhythm: regular  Rate: normal     Dental - normal exam     Pulmonary   Breath sounds clear to auscultation     Abdominal        Anesthesia Plan    History of general anesthesia?: yes  History of complications of general anesthesia?: yes    ASA 2     general     The patient is not a current smoker.    intravenous induction   Postoperative administration of opioids is intended.  Trial extubation is planned.  Anesthetic plan and risks discussed with patient.  Use of blood products discussed with patient who consented to blood products.    Plan discussed with resident and attending.

## 2024-10-24 NOTE — OP NOTE
Date: 10/24/2024  OR Location: [facility] OR    Name: Meredith Mckenzie, : 1995, Age: 29 y.o., MRN: [id number], Sex: female    Diagnosis  Pre-op Diagnosis      * Endometriosis [N80.9] Post-op Diagnosis     * Endometriosis [N80.9]     Procedures  Hysterectomy Transvaginal Laparoscopy-Assisted,  with bilateral salpingectomy, left ovarian cystectomy, cystoscopy  70536 - [place] LAPS W/VAG HYSTERECT 250 GM/&RMVL TUBE&/OVARIES    robotic excision of endometriosis, possible appendectomy, possible Mirena, any indicated procedure  78986 - [place] LAPS ABD PRTM&OMENTUM DX W/WO SPEC BR/WA SPX  Bilateral ureterolysis     Surgeons      * Claritza Fenton - Primary    Resident/Fellow/Other Assistant:  Surgeons and Role:     * Kesha Gonzalez MD - Assisting     * Mariajose Stinson MD - Assisting    Procedure Summary  Anesthesia: General  ASA: II  Anesthesia Staff: Anesthesiologist: Darcie Ponce MD  Anesthesia Resident: Monica Carrillo MD  Estimated Blood Loss: 25 mL  Intra-op Medications:   Administrations occurring from 1340 to 1800 on 10/24/24:   Medication Name Total Dose   BUPivacaine HCl (Marcaine) 0.5 % (5 mg/mL) injection 10 mL   HYDROmorphone (Dilaudid) injection 0.2 mg 0.2 mg   lactated Ringer's infusion Cannot be calculated   oxygen (O2) therapy 36 L   methocarbamol (Robaxin) injection 1,000 mg 1,000 mg   ceFAZolin (Ancef) vial 1 g 2 g   dexAMETHasone (Decadron) 4 mg/mL 8 mg   HYDROmorphone (Dilaudid) injection 1 mg/mL 0.4 mg   ketorolac (Toradol) 30 mg 30 mg   metroNIDAZOLE (Flagyl)  mg/100 mL (premix) 500 mg   ondansetron 2 mg/mL 4 mg   rocuronium (ZeMuron) 50 mg/5 mL injection 30 mg   sugammadex (Bridion) 200 mg/2 mL injection 200 mg              Anesthesia Record               Intraprocedure I/O Totals          Intake    lactated Ringer's infusion 1000.00 mL    Total Intake 1000 mL       Output    Urine 320 mL    Est. Blood Loss 25 mL    Total Output 345 mL       Net    Net Volume 655 mL          Specimen:   ID Type  Source Tests Collected by Time   1 : Left Pelvic Sidewall Tissue PELVIC SIDE WALL EXCISION SURGICAL PATHOLOGY EXAM Claritza Fenton MD 10/24/2024 1511   2 : Right Pelvic Sidewall Tissue PELVIC SIDE WALL EXCISION SURGICAL PATHOLOGY EXAM Claritza Fenton MD 10/24/2024 1533   3 : Posterior Cul de Sac Tissue CUL DE SAC EXCISION SURGICAL PATHOLOGY EXAM Claritza Fenton MD 10/24/2024 1542   4 : Left Ovarian Cyst Wall Tissue CYST OVARY SURGICAL PATHOLOGY EXAM Claritza Fenton MD 10/24/2024 1551   5 : Bladder Peritoneum Tissue BLADDER BIOPSY SURGICAL PATHOLOGY EXAM Claritza Fenton MD 10/24/2024 1600   6 : Uterus, Cervix and Bilateral Fallopian Tubes Tissue UTERUS, CERVIX, AND BILATERAL FALLOPIAN TUBES SURGICAL PATHOLOGY EXAM Claritza Fenton MD 10/24/2024 1628        Staff:   Circulator: Keri  Scrub Person: Jose  Circulator: Jyoti Hauser Scrub: Carlos Hauser Scrub: Daniel Hauser Circulator: Lilly    Findings  Right Hemidiaphragm: normal, no evidence of endometriosis  Left Hemidiaphragm: normal, no evidence of endometriosis  Liver Edge: normal  Stomach Edge: normal     Large Intestines: normal, no evidence of endometriosis         Small Intestines: normal, no evidence of endometriosis  Appendix: normal appearing, no evidence of endometriosis    Right Ovary: normal  Right Fallopian Tube: normal  Left Ovary: 4cm ovarian cyst, upon exploration filled w/ clear fluid, otherwise grossly normal   Left Fallopian Tube: normal  Uterus: 7 week size, mobile, no abnormalities  Chromopertubation: N/A     Right Pelvic Sidewall: no evidence of endometriosis  Right Uterosacral Ligament: scattered gunpowder lesions, white scar   Left Pelvic Sidewall: no evidence of endometriosis  Left Uterosacral Ligament: scattered gunpowder lesions, white scar   Posterior Cul De Sac: scattered gunpowder lesions, white scar   Bladder Peritoneum: no evidence of endometriosis     Description of Procedure  Patient was taken to the operating  room where she was prepared and draped in the usual sterile fashion.  A V-Care uterine manipulator was placed.  A Salomon catheter was placed. Attention was then turned abdominally.  The base of the umbilicus was elevated using Kocher clamps.  The skin was incised with a scalpel.  The fascia was grasped with Kochers and entered sharply using a scalpel.  Peritoneum was bluntly opened using a Bette clamp.  Intraperitoneal placement was confirmed via visualization of underlying bowel.     Nguyễn trocar was then placed at the umbilical entry site. Diagnostic laparoscopy was performed, and revealed findings as noted above.  No areas of trauma or injury were noted immediately inferior to the umbilicus.  The patient was placed in steep Trendelenburg positioning.  Pelvic findings were as noted above.       Ancillary trocars were then placed under direct visualization. Two robotic trocars and one 8 mm assistant port were used. The small intestine was run from the ileocecal junction to the level of the duodenum.  No abnormalities were noted.  The robot was then docked.    The cyst noted on the left ovary was then opened, drained, and the left cyst wall removed. The ovary was then re-approximated w/ running V-loc suture.      The ovaries were pexied to the anterior abdominal wall using a V-loc.  The posterior cul-de-sac was then inspected.  The pelvic sidewall peritoneum on the left was elevated at the pelvic brim away from underlying structures away from the ureter and iliac vasculature.  It was incised using monopolar electrosurgery.  The incision was carried parallel to the infundibulopelvic ligament, inferior to the ovarian stroma, and parallel to the uterus.  The peritoneum was then elevated away from the pelvic sidewall.  The ureter was identified and dissected.  The pelvic sidewall peritoneum was fully  from the retroperitoneal structures using a combination of monopolar electrosurgery and blunt dissection.  Full  ureterolysis was completed to ensure that all peritoneum was being excised while a safe margin was being maintained from the ureter.  No areas of peritoneum were noted to remain on the left pelvic sidewall.     Attention was then turned to the right pelvic sidewall.  The peritoneum was excised in a manner identical to that completed on the left.  The posterior cul-de-sac and pelvic sidewalls were then thoroughly suction irrigated.  Excellent hemostasis was noted and no areas of trauma were noted.     Attention was then turned to the posterior cul-de-sac.  The posterior cul-de-sac peritoneum was fully excised using a combination of monopolar electrosurgery and blunt dissection.  Care was taken to avoid injury to the underlying rectum and bowel.  The pexied ovaries were released.  Excellent hemostasis was noted.       Attention was then turned anteriorly.  The bladder peritoneum was incised using monopolar electrosurgery.  A peritoneal incision was carried from the left round ligament to the pubic symphysis and ended at the right round ligament.  The peritoneum was then stripped away from the underlying bladder using a combination of monopolar electrosurgery and blunt dissection.  Full excision of bladder peritoneum was noted at the conclusion of this dissection.    We then began the hysterectomy. Bilateral Fallopian tubes were  from the underlying mesosalpinx to the level of the cornua with the Vessel Sealer device. The round and utero-ovarian ligaments were clamped, sealed, and transected with the Vessel Sealer. The bladder flap was then created with the use of monopolar cautery and blunt dissection. Bilateral uterine arteries were then clamped, sealed, transected, and lateralized with the Vessel Sealer. A colpotomy was made with monopolar cautery, and the uterus, cervix, and bilateral Fallopian tubes were easily removed through the vagina. The vaginal cuff was closed with a running suture of 0 V-loc.       The pelvis was then thoroughly suction irrigated.  Excellent hemostasis was noted. Surgicell powder was placed throughout.  All instruments were then removed from the abdomen and pelvis.  The robot was undocked.     The umbilical fascia was then closed with 0 Vicryl suture.  The skin was closed with 4-0 Monocryl. The Salomon catheter was removed. Cystoscopy was performed, and showed an atraumatic bladder with bilateral ureteral efflux.    The patient was awakened from general anesthesia and taken the recovery room in stable condition.      I was present and scrubbed for the entirety of the surgical procedure.       Complications:  None; patient tolerated the procedure well.     Disposition: PACU - hemodynamically stable.  Condition: stable

## 2024-10-24 NOTE — H&P
Gynecologic Surgery History and Physical    Subjective   30 yo P0 with dysmenorrhea, vaginismus, vulvodynia, and high tone pelvic floor dysfunction presenting for robotic excision of endometriosis, total robotic hysterectomy, bilateral salpingectomy, possible appendectomy, any other indicated procedures     Imaging:   - Pelvic US 2/2022 showed uterus measuring 7cm x 2cm x 3cm. Bilateral ovaries WNL.   Screening:   - Last pap 2022 WNL per pt, no hx of CIN2-3  PMHx: depression/anxiety, BD2, HLD, cystic acne, palpitations (s/p normal Cards consult, PVC's)  PSHx: wisdom teeth, adenoids, ear tubes     Labs: (10/9/24) Hgb 14.5, Cr 0.73    Obstetrical History   OB History   No obstetric history on file.       Past Medical History  Past Medical History:   Diagnosis Date    Anxiety     Bipolar 2 disorder (Multi)     Cystic acne     Hyperlipidemia     Major depressive disorder, recurrent, in partial remission ([facility]) 05/14/2018    Major depressive disorder, recurrent episode, in partial remission    Palpitations     s/p normal Cards eval, PVC's        Past Surgical History   Past Surgical History:   Procedure Laterality Date    ADENOIDECTOMY      CT HEAD ANGIO W AND WO IV CONTRAST  05/31/2019    CT HEAD ANGIO W AND WO IV CONTRAST 5/31/2019 [facility]    TYMPANOSTOMY TUBE PLACEMENT      WISDOM TOOTH EXTRACTION         Social History  Social History     Tobacco Use    Smoking status: Never    Smokeless tobacco: Never   Substance Use Topics    Alcohol use: Yes     Alcohol/week: 1.0 standard drink of alcohol     Types: 1 Glasses of wine per week     Substance and Sexual Activity   Drug Use Not Currently    Types: Marijuana    Comment: medical       Allergies  Sertraline     Medications  Medications Prior to Admission   Medication Sig Dispense Refill Last Dose/Taking    atorvastatin (Lipitor) 80 mg tablet Take 1 tablet (80 mg) by mouth once daily.       estradiol (Estrace) 0.01 % (0.1 mg/gram) vaginal cream Use nightly  for one month. You may use the applicator if you prefer, or you can place a dime-sized amount on your finger and spread over the opening of the vagina. Wash hands well after use. 42.5 g 12     LORazepam (Ativan) 0.5 mg tablet Take 1 tablet the evening before your surgery as needed for anxiety. If after 1 hour you have no improvement, you may take the 2nd tablet. 2 tablet 0     NORETHINDRONE, CONTRACEPTIVE, ORAL        tretinoin (Retin-A) 0.025 % cream apply pea sized amount to full face 2-3 nights per week, increase to nightly as tolerated. Mix directly with an oil free moisturizer if too drying.          ROS: negative except per HPI    Objective    Last Vitals  Vital reviewed    Physical Examination  General: no acute distress  HEENT: normocephalic, atraumatic  Heart: warm and well perfused  Lungs: breathing comfortably on room air  Abdomen: nondistended  Extremities: moving all extremities  Neuro: awake and conversant  Psych: appropriate mood and affect    Lab Review          Lab Results   Component Value Date    WBC 8.5 10/09/2024    HGB 14.5 10/09/2024    HCT 43.7 10/09/2024    MCV 94 10/09/2024     10/09/2024       Lab Results   Component Value Date    GLUCOSE 88 10/09/2024    CALCIUM 9.9 10/09/2024     10/09/2024    K 5.0 10/09/2024    CO2 25 10/09/2024     10/09/2024    BUN 14 10/09/2024    CREATININE 0.73 10/09/2024         Assessment/Plan    Meredith Mckenzie is a 29 y.o. with dysmenorrhea, vaginismus, vulvodynia, and high tone pelvic floor dysfunction presenting for scheduled surgery.    Plan to proceed with robotic excision of endometriosis, total robotic hysterectomy, bilateral salpingectomy, possible appendectomy, any other indicated procedures   Surgical consent was reviewed. The risks of surgery were discussed including: bleeding (including need for blood transfusion in life-threatening situations; risks of transfusion), infection, damage to surrounding organs. The patient had the  opportunity to answer questions and desired to proceed with surgery following our discussion. Both verbal and written consents were obtained.    Seen and discussed with Dr. Eliceo Stinson MD PGY-2  Gynecology

## 2024-10-24 NOTE — ANESTHESIA PROCEDURE NOTES
Peripheral IV  Date/Time: 10/24/2024 1:00 PM      Placement  Needle size: 24 G  Laterality: right  Location: hand  Local anesthetic: none  Site prep: chlorhexidine  Technique: anatomical landmarks  Difficult Venous Access: Yes

## 2024-10-24 NOTE — ANESTHESIA POSTPROCEDURE EVALUATION
Patient: Meredith Mckenzie    Procedure Summary       Date: 10/24/24 Room / Location: [facility] OR 02 / [facility]    Anesthesia Start: 1329 Anesthesia Stop: 1713    Procedures:       Hysterectomy Transvaginal Laparoscopy-Assisted,  with bilateral salpingectomy, left ovarian cystectomy, cystoscopy (Pelvis)      robotic excision of endometriosis, possible appendectomy, possible Mirena, any indicated procedure (Pelvis) Diagnosis:       Endometriosis      (Endometriosis [N80.9])    Surgeons: Claritza Fenton MD Responsible Provider: Darcie Ponce MD    Anesthesia Type: general ASA Status: 2            Anesthesia Type: general    Vitals Value Taken Time   /69 10/24/24 1709   Temp 36 °C (96.8 °F) 10/24/24 1709   Pulse 72 10/24/24 1711   Resp 3 10/24/24 1711   SpO2 100 % 10/24/24 1711   Vitals shown include unfiled device data.    Anesthesia Post Evaluation    Patient location during evaluation: PACU  Patient participation: complete - patient participated  Level of consciousness: awake and alert  Pain score: 2  Pain management: adequate  Airway patency: patent  Cardiovascular status: acceptable, hemodynamically stable and blood pressure returned to baseline  Respiratory status: acceptable, spontaneous ventilation, unassisted and face mask  Hydration status: acceptable  Postoperative Nausea and Vomiting: none    There were no known notable events for this encounter.

## 2024-10-24 NOTE — ANESTHESIA PROCEDURE NOTES
Airway  Date/Time: 10/24/2024 1:37 PM  Urgency: elective    Airway not difficult    Staffing  Performed: resident   Authorized by: Darcie Ponce MD    Performed by: Monica Carrillo MD  Patient location during procedure: OR    Indications and Patient Condition  Indications for airway management: anesthesia  Spontaneous Ventilation: absent  Sedation level: deep  Preoxygenated: yes  Patient position: sniffing  Mask difficulty assessment: 1 - vent by mask  Planned trial extubation    Final Airway Details  Final airway type: endotracheal airway      Successful airway: ETT  Cuffed: yes   Successful intubation technique: direct laryngoscopy  Facilitating devices/methods: intubating stylet  Endotracheal tube insertion site: oral  Blade: Maral  Blade size: #3  ETT size (mm): 7.0  Cormack-Lehane Classification: grade IIa - partial view of glottis  Placement verified by: capnometry   Measured from: lips  ETT to lips (cm): 22

## 2024-10-24 NOTE — DISCHARGE INSTRUCTIONS
Post-Operation Instructions  What care is needed at home?  Ask your doctor what you need to do when you go home. Make sure you ask questions if you do not understand what you need to do.  You can take off the bandaid covering your incision in 1-2 days.  You can shower, but do not scrub your incisions, let warm soapy water flow over them.  Do not lift things over 10 pounds (4.5 kg).  Do not put anything in your vagina until cleared by your provider: no sex, douching, tampons.  Be sure to wash your hands before and after touching your wound or dressing.  Your bowel movements may take some time to get back to normal. Eat small meals high in fiber to avoid hard stools. Drink 6 to 8 glasses of water each day.  Try to walk each day. Start by walking a little more than you did the day before. Walking boosts blood flow and helps prevent lung, belly, and blood problems.    What follow-up care is needed?  We will call you to schedule a follow up visit with your surgeon. Be sure to keep your visits.  You have stitches. They will dissolve on their own. The surgical glue will fall off on its own as well.    What drugs may be needed?  We are sending you home with ibuprofen, tylenol, oxycodone (for pain), and Senna (a stool softener).     What problems could happen?  Infection  Wound opening  Heavy blood loss  Blood clots in your legs or lungs  Damage to your bowel, bladder, and other organs inside the belly  Trouble passing bowel movements    When do I need to call the doctor?  Signs of infection such as a fever of 100.4°F (38°C) or higher, chills, pain with passing urine.  Signs of wound infection such as swelling, redness, warmth around the wound; too much pain when touched; yellowish, greenish, or bloody discharge; foul smell coming from the cut site; cut site opens up.  Excessive blood in your sanitary pads or more than six soaked pads in a day. (Spotting is normal).  Smelly green or dark yellow vaginal discharge  Upset  stomach, throwing up, or very bad belly pain  Pain not helped by drugs you are taking  No bowel movement after 3 days  If you feel the need to pass urine but urine will not come out even after 6 hours  Swelling in your leg or arm that is much greater on one side than the other    Follow up with Dr. Fenton in 4 weeks for a postoperative visit. The office will call you to schedule

## 2024-10-25 DIAGNOSIS — G89.18 POST-OPERATIVE PAIN: Primary | ICD-10-CM

## 2024-10-25 RX ORDER — IBUPROFEN 600 MG/1
600 TABLET ORAL EVERY 6 HOURS PRN
Qty: 30 TABLET | Refills: 1 | Status: SHIPPED | OUTPATIENT
Start: 2024-10-25 | End: 2024-11-04

## 2024-10-31 ENCOUNTER — APPOINTMENT (OUTPATIENT)
Dept: OBSTETRICS AND GYNECOLOGY | Facility: CLINIC | Age: 29
End: 2024-10-31
Payer: COMMERCIAL

## 2024-11-01 ENCOUNTER — LAB (OUTPATIENT)
Dept: LAB | Facility: LAB | Age: 29
End: 2024-11-01
Payer: COMMERCIAL

## 2024-11-01 ENCOUNTER — TELEPHONE (OUTPATIENT)
Dept: RADIOLOGY | Facility: CLINIC | Age: 29
End: 2024-11-01
Payer: COMMERCIAL

## 2024-11-01 DIAGNOSIS — R30.0 DYSURIA: ICD-10-CM

## 2024-11-01 DIAGNOSIS — R30.0 DYSURIA: Primary | ICD-10-CM

## 2024-11-01 LAB
APPEARANCE UR: CLEAR
BILIRUB UR STRIP.AUTO-MCNC: NEGATIVE MG/DL
COLOR UR: NORMAL
GLUCOSE UR STRIP.AUTO-MCNC: NORMAL MG/DL
KETONES UR STRIP.AUTO-MCNC: NEGATIVE MG/DL
LEUKOCYTE ESTERASE UR QL STRIP.AUTO: NEGATIVE
NITRITE UR QL STRIP.AUTO: NEGATIVE
PH UR STRIP.AUTO: 6.5 [PH]
PROT UR STRIP.AUTO-MCNC: NEGATIVE MG/DL
RBC # UR STRIP.AUTO: NEGATIVE /UL
SP GR UR STRIP.AUTO: 1.01
UROBILINOGEN UR STRIP.AUTO-MCNC: NORMAL MG/DL

## 2024-11-01 PROCEDURE — 81003 URINALYSIS AUTO W/O SCOPE: CPT

## 2024-11-01 RX ORDER — SULFAMETHOXAZOLE AND TRIMETHOPRIM 800; 160 MG/1; MG/1
1 TABLET ORAL 2 TIMES DAILY
Qty: 10 TABLET | Refills: 0 | Status: SHIPPED | OUTPATIENT
Start: 2024-11-01 | End: 2024-11-01 | Stop reason: SDUPTHER

## 2024-11-01 RX ORDER — SULFAMETHOXAZOLE AND TRIMETHOPRIM 800; 160 MG/1; MG/1
1 TABLET ORAL 2 TIMES DAILY
Qty: 10 TABLET | Refills: 0 | Status: SHIPPED | OUTPATIENT
Start: 2024-11-01 | End: 2024-11-06

## 2024-11-01 RX ORDER — PHENAZOPYRIDINE HYDROCHLORIDE 200 MG/1
200 TABLET, FILM COATED ORAL 3 TIMES DAILY PRN
Qty: 10 TABLET | Refills: 0 | Status: SHIPPED | OUTPATIENT
Start: 2024-11-01 | End: 2024-11-01 | Stop reason: SDUPTHER

## 2024-11-01 RX ORDER — PHENAZOPYRIDINE HYDROCHLORIDE 200 MG/1
200 TABLET, FILM COATED ORAL 3 TIMES DAILY PRN
Qty: 10 TABLET | Refills: 0 | Status: SHIPPED | OUTPATIENT
Start: 2024-11-01 | End: 2024-11-04

## 2024-11-02 ENCOUNTER — TELEPHONE (OUTPATIENT)
Dept: OBSTETRICS AND GYNECOLOGY | Facility: CLINIC | Age: 29
End: 2024-11-02
Payer: COMMERCIAL

## 2024-11-02 LAB — HOLD SPECIMEN: NORMAL

## 2024-11-08 LAB
LABORATORY COMMENT REPORT: NORMAL
PATH REPORT.FINAL DX SPEC: NORMAL
PATH REPORT.GROSS SPEC: NORMAL
PATH REPORT.RELEVANT HX SPEC: NORMAL
PATH REPORT.TOTAL CANCER: NORMAL

## 2024-11-25 ENCOUNTER — HOSPITAL ENCOUNTER (OUTPATIENT)
Facility: HOSPITAL | Age: 29
Setting detail: OBSERVATION
Discharge: HOME | End: 2024-11-26
Attending: INTERNAL MEDICINE | Admitting: STUDENT IN AN ORGANIZED HEALTH CARE EDUCATION/TRAINING PROGRAM
Payer: COMMERCIAL

## 2024-11-25 ENCOUNTER — APPOINTMENT (OUTPATIENT)
Dept: RADIOLOGY | Facility: HOSPITAL | Age: 29
End: 2024-11-25
Payer: COMMERCIAL

## 2024-11-25 DIAGNOSIS — R10.30 LOWER ABDOMINAL PAIN: ICD-10-CM

## 2024-11-25 DIAGNOSIS — G89.18 POSTOPERATIVE PAIN: ICD-10-CM

## 2024-11-25 DIAGNOSIS — L02.91 ABSCESS: ICD-10-CM

## 2024-11-25 DIAGNOSIS — K59.03 DRUG-INDUCED CONSTIPATION: ICD-10-CM

## 2024-11-25 DIAGNOSIS — R18.8 INTRA-ABDOMINAL FLUID COLLECTION: Primary | ICD-10-CM

## 2024-11-25 LAB
ALBUMIN SERPL BCP-MCNC: 4.5 G/DL (ref 3.4–5)
ALP SERPL-CCNC: 50 U/L (ref 33–110)
ALT SERPL W P-5'-P-CCNC: 16 U/L (ref 7–45)
ANION GAP SERPL CALC-SCNC: 11 MMOL/L (ref 10–20)
AST SERPL W P-5'-P-CCNC: 14 U/L (ref 9–39)
BASOPHILS # BLD AUTO: 0.04 X10*3/UL (ref 0–0.1)
BASOPHILS NFR BLD AUTO: 0.2 %
BILIRUB SERPL-MCNC: 0.7 MG/DL (ref 0–1.2)
BUN SERPL-MCNC: 15 MG/DL (ref 6–23)
CALCIUM SERPL-MCNC: 9.2 MG/DL (ref 8.6–10.3)
CHLORIDE SERPL-SCNC: 103 MMOL/L (ref 98–107)
CO2 SERPL-SCNC: 26 MMOL/L (ref 21–32)
CREAT SERPL-MCNC: 0.64 MG/DL (ref 0.5–1.05)
EGFRCR SERPLBLD CKD-EPI 2021: >90 ML/MIN/1.73M*2
EOSINOPHIL # BLD AUTO: 0.09 X10*3/UL (ref 0–0.7)
EOSINOPHIL NFR BLD AUTO: 0.5 %
ERYTHROCYTE [DISTWIDTH] IN BLOOD BY AUTOMATED COUNT: 12.1 % (ref 11.5–14.5)
GLUCOSE SERPL-MCNC: 101 MG/DL (ref 74–99)
HCT VFR BLD AUTO: 40.6 % (ref 36–46)
HGB BLD-MCNC: 13.9 G/DL (ref 12–16)
IMM GRANULOCYTES # BLD AUTO: 0.07 X10*3/UL (ref 0–0.7)
IMM GRANULOCYTES NFR BLD AUTO: 0.4 % (ref 0–0.9)
LACTATE SERPL-SCNC: 1.5 MMOL/L (ref 0.4–2)
LIPASE SERPL-CCNC: 21 U/L (ref 9–82)
LYMPHOCYTES # BLD AUTO: 2.69 X10*3/UL (ref 1.2–4.8)
LYMPHOCYTES NFR BLD AUTO: 15.3 %
MCH RBC QN AUTO: 31.8 PG (ref 26–34)
MCHC RBC AUTO-ENTMCNC: 34.2 G/DL (ref 32–36)
MCV RBC AUTO: 93 FL (ref 80–100)
MONOCYTES # BLD AUTO: 1.4 X10*3/UL (ref 0.1–1)
MONOCYTES NFR BLD AUTO: 8 %
NEUTROPHILS # BLD AUTO: 13.26 X10*3/UL (ref 1.2–7.7)
NEUTROPHILS NFR BLD AUTO: 75.6 %
NRBC BLD-RTO: 0 /100 WBCS (ref 0–0)
PLATELET # BLD AUTO: 304 X10*3/UL (ref 150–450)
POTASSIUM SERPL-SCNC: 3.9 MMOL/L (ref 3.5–5.3)
PROT SERPL-MCNC: 7.6 G/DL (ref 6.4–8.2)
RBC # BLD AUTO: 4.37 X10*6/UL (ref 4–5.2)
SODIUM SERPL-SCNC: 136 MMOL/L (ref 136–145)
WBC # BLD AUTO: 17.6 X10*3/UL (ref 4.4–11.3)

## 2024-11-25 PROCEDURE — 2550000001 HC RX 255 CONTRASTS: Performed by: NURSE PRACTITIONER

## 2024-11-25 PROCEDURE — 76856 US EXAM PELVIC COMPLETE: CPT | Performed by: RADIOLOGY

## 2024-11-25 PROCEDURE — 76856 US EXAM PELVIC COMPLETE: CPT

## 2024-11-25 PROCEDURE — 83690 ASSAY OF LIPASE: CPT | Performed by: NURSE PRACTITIONER

## 2024-11-25 PROCEDURE — 99285 EMERGENCY DEPT VISIT HI MDM: CPT | Mod: 25

## 2024-11-25 PROCEDURE — 80053 COMPREHEN METABOLIC PANEL: CPT | Performed by: NURSE PRACTITIONER

## 2024-11-25 PROCEDURE — 96375 TX/PRO/DX INJ NEW DRUG ADDON: CPT

## 2024-11-25 PROCEDURE — 96361 HYDRATE IV INFUSION ADD-ON: CPT

## 2024-11-25 PROCEDURE — 36415 COLL VENOUS BLD VENIPUNCTURE: CPT | Performed by: NURSE PRACTITIONER

## 2024-11-25 PROCEDURE — 83605 ASSAY OF LACTIC ACID: CPT | Performed by: NURSE PRACTITIONER

## 2024-11-25 PROCEDURE — 2500000004 HC RX 250 GENERAL PHARMACY W/ HCPCS (ALT 636 FOR OP/ED): Performed by: NURSE PRACTITIONER

## 2024-11-25 PROCEDURE — 74177 CT ABD & PELVIS W/CONTRAST: CPT | Performed by: RADIOLOGY

## 2024-11-25 PROCEDURE — 74177 CT ABD & PELVIS W/CONTRAST: CPT

## 2024-11-25 PROCEDURE — 85025 COMPLETE CBC W/AUTO DIFF WBC: CPT | Performed by: NURSE PRACTITIONER

## 2024-11-25 PROCEDURE — 96365 THER/PROPH/DIAG IV INF INIT: CPT | Mod: 59

## 2024-11-25 PROCEDURE — 2500000004 HC RX 250 GENERAL PHARMACY W/ HCPCS (ALT 636 FOR OP/ED): Performed by: INTERNAL MEDICINE

## 2024-11-25 RX ORDER — ONDANSETRON HYDROCHLORIDE 2 MG/ML
4 INJECTION, SOLUTION INTRAVENOUS ONCE
Status: COMPLETED | OUTPATIENT
Start: 2024-11-25 | End: 2024-11-25

## 2024-11-25 RX ORDER — MORPHINE SULFATE 4 MG/ML
4 INJECTION, SOLUTION INTRAMUSCULAR; INTRAVENOUS ONCE
Status: COMPLETED | OUTPATIENT
Start: 2024-11-25 | End: 2024-11-25

## 2024-11-25 ASSESSMENT — COLUMBIA-SUICIDE SEVERITY RATING SCALE - C-SSRS
6. HAVE YOU EVER DONE ANYTHING, STARTED TO DO ANYTHING, OR PREPARED TO DO ANYTHING TO END YOUR LIFE?: NO
1. IN THE PAST MONTH, HAVE YOU WISHED YOU WERE DEAD OR WISHED YOU COULD GO TO SLEEP AND NOT WAKE UP?: NO
2. HAVE YOU ACTUALLY HAD ANY THOUGHTS OF KILLING YOURSELF?: NO

## 2024-11-25 ASSESSMENT — PAIN DESCRIPTION - LOCATION: LOCATION: ABDOMEN

## 2024-11-25 ASSESSMENT — PAIN - FUNCTIONAL ASSESSMENT: PAIN_FUNCTIONAL_ASSESSMENT: 0-10

## 2024-11-25 ASSESSMENT — PAIN DESCRIPTION - PAIN TYPE: TYPE: ACUTE PAIN

## 2024-11-25 ASSESSMENT — PAIN DESCRIPTION - DESCRIPTORS: DESCRIPTORS: SHARP;CRAMPING;STABBING

## 2024-11-25 ASSESSMENT — PAIN DESCRIPTION - ORIENTATION: ORIENTATION: RIGHT;LEFT

## 2024-11-25 ASSESSMENT — PAIN DESCRIPTION - FREQUENCY: FREQUENCY: CONSTANT/CONTINUOUS

## 2024-11-25 ASSESSMENT — PAIN SCALES - GENERAL: PAINLEVEL_OUTOF10: 8

## 2024-11-26 ENCOUNTER — PHARMACY VISIT (OUTPATIENT)
Dept: PHARMACY | Facility: CLINIC | Age: 29
End: 2024-11-26
Payer: COMMERCIAL

## 2024-11-26 VITALS
HEART RATE: 80 BPM | TEMPERATURE: 98.2 F | SYSTOLIC BLOOD PRESSURE: 116 MMHG | WEIGHT: 140 LBS | HEIGHT: 66 IN | RESPIRATION RATE: 20 BRPM | OXYGEN SATURATION: 97 % | DIASTOLIC BLOOD PRESSURE: 65 MMHG | BODY MASS INDEX: 22.5 KG/M2

## 2024-11-26 PROBLEM — L02.91 ABSCESS: Status: ACTIVE | Noted: 2024-11-26

## 2024-11-26 LAB
ANION GAP SERPL CALC-SCNC: 8 MMOL/L (ref 10–20)
BUN SERPL-MCNC: 10 MG/DL (ref 6–23)
CALCIUM SERPL-MCNC: 8.3 MG/DL (ref 8.6–10.3)
CHLORIDE SERPL-SCNC: 106 MMOL/L (ref 98–107)
CO2 SERPL-SCNC: 26 MMOL/L (ref 21–32)
CREAT SERPL-MCNC: 0.61 MG/DL (ref 0.5–1.05)
EGFRCR SERPLBLD CKD-EPI 2021: >90 ML/MIN/1.73M*2
ERYTHROCYTE [DISTWIDTH] IN BLOOD BY AUTOMATED COUNT: 12.2 % (ref 11.5–14.5)
GLUCOSE SERPL-MCNC: 92 MG/DL (ref 74–99)
HCT VFR BLD AUTO: 36.2 % (ref 36–46)
HGB BLD-MCNC: 12.2 G/DL (ref 12–16)
MCH RBC QN AUTO: 31.9 PG (ref 26–34)
MCHC RBC AUTO-ENTMCNC: 33.7 G/DL (ref 32–36)
MCV RBC AUTO: 95 FL (ref 80–100)
NRBC BLD-RTO: 0 /100 WBCS (ref 0–0)
PLATELET # BLD AUTO: 237 X10*3/UL (ref 150–450)
POTASSIUM SERPL-SCNC: 3.9 MMOL/L (ref 3.5–5.3)
RBC # BLD AUTO: 3.83 X10*6/UL (ref 4–5.2)
SODIUM SERPL-SCNC: 136 MMOL/L (ref 136–145)
WBC # BLD AUTO: 11.7 X10*3/UL (ref 4.4–11.3)

## 2024-11-26 PROCEDURE — 36415 COLL VENOUS BLD VENIPUNCTURE: CPT | Performed by: NURSE PRACTITIONER

## 2024-11-26 PROCEDURE — G0378 HOSPITAL OBSERVATION PER HR: HCPCS

## 2024-11-26 PROCEDURE — 85027 COMPLETE CBC AUTOMATED: CPT | Performed by: NURSE PRACTITIONER

## 2024-11-26 PROCEDURE — 96366 THER/PROPH/DIAG IV INF ADDON: CPT

## 2024-11-26 PROCEDURE — 99222 1ST HOSP IP/OBS MODERATE 55: CPT | Performed by: NURSE PRACTITIONER

## 2024-11-26 PROCEDURE — 2500000004 HC RX 250 GENERAL PHARMACY W/ HCPCS (ALT 636 FOR OP/ED): Performed by: INTERNAL MEDICINE

## 2024-11-26 PROCEDURE — 99238 HOSP IP/OBS DSCHRG MGMT 30/<: CPT

## 2024-11-26 PROCEDURE — 96375 TX/PRO/DX INJ NEW DRUG ADDON: CPT

## 2024-11-26 PROCEDURE — 80048 BASIC METABOLIC PNL TOTAL CA: CPT | Performed by: NURSE PRACTITIONER

## 2024-11-26 PROCEDURE — 96376 TX/PRO/DX INJ SAME DRUG ADON: CPT

## 2024-11-26 PROCEDURE — 2500000004 HC RX 250 GENERAL PHARMACY W/ HCPCS (ALT 636 FOR OP/ED): Performed by: NURSE PRACTITIONER

## 2024-11-26 PROCEDURE — 2500000001 HC RX 250 WO HCPCS SELF ADMINISTERED DRUGS (ALT 637 FOR MEDICARE OP): Performed by: NURSE PRACTITIONER

## 2024-11-26 PROCEDURE — RXMED WILLOW AMBULATORY MEDICATION CHARGE

## 2024-11-26 PROCEDURE — 99221 1ST HOSP IP/OBS SF/LOW 40: CPT | Performed by: OBSTETRICS & GYNECOLOGY

## 2024-11-26 RX ORDER — ACETAMINOPHEN 325 MG/1
650 TABLET ORAL EVERY 4 HOURS PRN
Status: DISCONTINUED | OUTPATIENT
Start: 2024-11-26 | End: 2024-11-26 | Stop reason: HOSPADM

## 2024-11-26 RX ORDER — OXYCODONE HYDROCHLORIDE 5 MG/1
5 TABLET ORAL EVERY 6 HOURS PRN
Qty: 12 TABLET | Refills: 0 | Status: SHIPPED | OUTPATIENT
Start: 2024-11-26 | End: 2024-11-29

## 2024-11-26 RX ORDER — MORPHINE SULFATE 2 MG/ML
2 INJECTION, SOLUTION INTRAMUSCULAR; INTRAVENOUS ONCE
Status: COMPLETED | OUTPATIENT
Start: 2024-11-26 | End: 2024-11-26

## 2024-11-26 RX ORDER — KETOROLAC TROMETHAMINE 30 MG/ML
30 INJECTION, SOLUTION INTRAMUSCULAR; INTRAVENOUS EVERY 6 HOURS PRN
Status: DISCONTINUED | OUTPATIENT
Start: 2024-11-26 | End: 2024-11-26 | Stop reason: HOSPADM

## 2024-11-26 RX ORDER — ONDANSETRON 4 MG/1
4 TABLET, FILM COATED ORAL EVERY 8 HOURS PRN
Status: DISCONTINUED | OUTPATIENT
Start: 2024-11-26 | End: 2024-11-26 | Stop reason: HOSPADM

## 2024-11-26 RX ORDER — AMOXICILLIN AND CLAVULANATE POTASSIUM 500; 125 MG/1; MG/1
1 TABLET, FILM COATED ORAL 3 TIMES DAILY
Qty: 30 TABLET | Refills: 0 | Status: SHIPPED | OUTPATIENT
Start: 2024-11-26 | End: 2024-11-26 | Stop reason: HOSPADM

## 2024-11-26 RX ORDER — ACETAMINOPHEN 160 MG/5ML
650 SOLUTION ORAL EVERY 4 HOURS PRN
Status: DISCONTINUED | OUTPATIENT
Start: 2024-11-26 | End: 2024-11-26 | Stop reason: HOSPADM

## 2024-11-26 RX ORDER — ATORVASTATIN CALCIUM 40 MG/1
80 TABLET, FILM COATED ORAL DAILY
Status: DISCONTINUED | OUTPATIENT
Start: 2024-11-26 | End: 2024-11-26 | Stop reason: HOSPADM

## 2024-11-26 RX ORDER — OXYCODONE HYDROCHLORIDE 5 MG/1
5 TABLET ORAL EVERY 4 HOURS PRN
Status: DISCONTINUED | OUTPATIENT
Start: 2024-11-26 | End: 2024-11-26

## 2024-11-26 RX ORDER — ACETAMINOPHEN 650 MG/1
650 SUPPOSITORY RECTAL EVERY 4 HOURS PRN
Status: DISCONTINUED | OUTPATIENT
Start: 2024-11-26 | End: 2024-11-26 | Stop reason: HOSPADM

## 2024-11-26 RX ORDER — AMOXICILLIN AND CLAVULANATE POTASSIUM 500; 125 MG/1; MG/1
1 TABLET, FILM COATED ORAL 3 TIMES DAILY
Qty: 30 TABLET | Refills: 0 | Status: SHIPPED | OUTPATIENT
Start: 2024-11-26 | End: 2024-11-26

## 2024-11-26 RX ORDER — AMOXICILLIN AND CLAVULANATE POTASSIUM 875; 125 MG/1; MG/1
875 TABLET, FILM COATED ORAL 2 TIMES DAILY
Qty: 20 TABLET | Refills: 0 | Status: SHIPPED | OUTPATIENT
Start: 2024-11-26 | End: 2024-12-06

## 2024-11-26 RX ORDER — SENNOSIDES 8.6 MG/1
2 TABLET ORAL DAILY
Qty: 6 TABLET | Refills: 0 | Status: SHIPPED | OUTPATIENT
Start: 2024-11-26 | End: 2024-11-29

## 2024-11-26 RX ORDER — ONDANSETRON HYDROCHLORIDE 2 MG/ML
4 INJECTION, SOLUTION INTRAVENOUS EVERY 8 HOURS PRN
Status: DISCONTINUED | OUTPATIENT
Start: 2024-11-26 | End: 2024-11-26 | Stop reason: HOSPADM

## 2024-11-26 RX ORDER — POLYETHYLENE GLYCOL 3350 17 G/17G
17 POWDER, FOR SOLUTION ORAL DAILY PRN
Status: DISCONTINUED | OUTPATIENT
Start: 2024-11-26 | End: 2024-11-26 | Stop reason: HOSPADM

## 2024-11-26 RX ORDER — OXYCODONE HYDROCHLORIDE 5 MG/1
5 TABLET ORAL EVERY 4 HOURS PRN
Status: DISCONTINUED | OUTPATIENT
Start: 2024-11-26 | End: 2024-11-26 | Stop reason: HOSPADM

## 2024-11-26 SDOH — SOCIAL STABILITY: SOCIAL INSECURITY
WITHIN THE LAST YEAR, HAVE YOU BEEN RAPED OR FORCED TO HAVE ANY KIND OF SEXUAL ACTIVITY BY YOUR PARTNER OR EX-PARTNER?: NO

## 2024-11-26 SDOH — SOCIAL STABILITY: SOCIAL INSECURITY: HAVE YOU HAD THOUGHTS OF HARMING ANYONE ELSE?: NO

## 2024-11-26 SDOH — ECONOMIC STABILITY: FOOD INSECURITY: WITHIN THE PAST 12 MONTHS, YOU WORRIED THAT YOUR FOOD WOULD RUN OUT BEFORE YOU GOT THE MONEY TO BUY MORE.: NEVER TRUE

## 2024-11-26 SDOH — SOCIAL STABILITY: SOCIAL INSECURITY
WITHIN THE LAST YEAR, HAVE YOU BEEN KICKED, HIT, SLAPPED, OR OTHERWISE PHYSICALLY HURT BY YOUR PARTNER OR EX-PARTNER?: NO

## 2024-11-26 SDOH — HEALTH STABILITY: MENTAL HEALTH: HOW OFTEN DO YOU HAVE A DRINK CONTAINING ALCOHOL?: MONTHLY OR LESS

## 2024-11-26 SDOH — ECONOMIC STABILITY: FOOD INSECURITY: WITHIN THE PAST 12 MONTHS, THE FOOD YOU BOUGHT JUST DIDN'T LAST AND YOU DIDN'T HAVE MONEY TO GET MORE.: NEVER TRUE

## 2024-11-26 SDOH — ECONOMIC STABILITY: HOUSING INSECURITY: AT ANY TIME IN THE PAST 12 MONTHS, WERE YOU HOMELESS OR LIVING IN A SHELTER (INCLUDING NOW)?: NO

## 2024-11-26 SDOH — SOCIAL STABILITY: SOCIAL INSECURITY: ARE YOU OR HAVE YOU BEEN THREATENED OR ABUSED PHYSICALLY, EMOTIONALLY, OR SEXUALLY BY ANYONE?: NO

## 2024-11-26 SDOH — ECONOMIC STABILITY: FOOD INSECURITY

## 2024-11-26 SDOH — SOCIAL STABILITY: SOCIAL INSECURITY: WITHIN THE LAST YEAR, HAVE YOU BEEN HUMILIATED OR EMOTIONALLY ABUSED IN OTHER WAYS BY YOUR PARTNER OR EX-PARTNER?: NO

## 2024-11-26 SDOH — SOCIAL STABILITY: SOCIAL INSECURITY: DO YOU FEEL UNSAFE GOING BACK TO THE PLACE WHERE YOU ARE LIVING?: NO

## 2024-11-26 SDOH — ECONOMIC STABILITY: GENERAL

## 2024-11-26 SDOH — ECONOMIC STABILITY: HOUSING INSECURITY: IN THE LAST 12 MONTHS, WAS THERE A TIME WHEN YOU WERE NOT ABLE TO PAY THE MORTGAGE OR RENT ON TIME?: NO

## 2024-11-26 SDOH — HEALTH STABILITY: MENTAL HEALTH: HOW OFTEN DO YOU HAVE SIX OR MORE DRINKS ON ONE OCCASION?: NEVER

## 2024-11-26 SDOH — SOCIAL STABILITY: SOCIAL INSECURITY: ARE YOU MARRIED, WIDOWED, DIVORCED, SEPARATED, NEVER MARRIED, OR LIVING WITH A PARTNER?: MARRIED

## 2024-11-26 SDOH — HEALTH STABILITY: MENTAL HEALTH
DO YOU FEEL STRESS - TENSE, RESTLESS, NERVOUS, OR ANXIOUS, OR UNABLE TO SLEEP AT NIGHT BECAUSE YOUR MIND IS TROUBLED ALL THE TIME - THESE DAYS?: ONLY A LITTLE

## 2024-11-26 SDOH — ECONOMIC STABILITY: FOOD INSECURITY: WITHIN THE PAST 12 MONTHS, YOU WORRIED THAT YOUR FOOD WOULD RUN OUT BEFORE YOU GOT MONEY TO BUY MORE.: NEVER TRUE

## 2024-11-26 SDOH — ECONOMIC STABILITY: TRANSPORTATION INSECURITY: IN THE PAST 12 MONTHS, HAS LACK OF TRANSPORTATION KEPT YOU FROM MEDICAL APPOINTMENTS OR FROM GETTING MEDICATIONS?: NO

## 2024-11-26 SDOH — SOCIAL STABILITY: SOCIAL NETWORK
IN A TYPICAL WEEK, HOW MANY TIMES DO YOU TALK ON THE PHONE WITH FAMILY, FRIENDS, OR NEIGHBORS?: MORE THAN THREE TIMES A WEEK

## 2024-11-26 SDOH — ECONOMIC STABILITY: INCOME INSECURITY: IN THE LAST 12 MONTHS, WAS THERE A TIME WHEN YOU WERE NOT ABLE TO PAY THE MORTGAGE OR RENT ON TIME?: NO

## 2024-11-26 SDOH — ECONOMIC STABILITY: HOUSING INSECURITY
IN THE LAST 12 MONTHS, WAS THERE A TIME WHEN YOU DID NOT HAVE A STEADY PLACE TO SLEEP OR SLEPT IN A SHELTER (INCLUDING NOW)?: NO

## 2024-11-26 SDOH — ECONOMIC STABILITY: INCOME INSECURITY: IN THE PAST 12 MONTHS HAS THE ELECTRIC, GAS, OIL, OR WATER COMPANY THREATENED TO SHUT OFF SERVICES IN YOUR HOME?: NO

## 2024-11-26 SDOH — SOCIAL STABILITY: SOCIAL INSECURITY: HAS ANYONE EVER THREATENED TO HURT YOUR FAMILY OR YOUR PETS?: NO

## 2024-11-26 SDOH — ECONOMIC STABILITY: TRANSPORTATION INSECURITY
IN THE PAST 12 MONTHS, HAS THE LACK OF TRANSPORTATION KEPT YOU FROM MEDICAL APPOINTMENTS OR FROM GETTING MEDICATIONS?: NO

## 2024-11-26 SDOH — SOCIAL STABILITY: SOCIAL INSECURITY: WITHIN THE LAST YEAR, HAVE YOU BEEN AFRAID OF YOUR PARTNER OR EX-PARTNER?: NO

## 2024-11-26 SDOH — SOCIAL STABILITY: SOCIAL NETWORK: HOW OFTEN DO YOU ATTEND CHURCH OR RELIGIOUS SERVICES?: NEVER

## 2024-11-26 SDOH — SOCIAL STABILITY: SOCIAL INSECURITY: ABUSE: ADULT

## 2024-11-26 SDOH — SOCIAL STABILITY: SOCIAL INSECURITY: ARE THERE ANY APPARENT SIGNS OF INJURIES/BEHAVIORS THAT COULD BE RELATED TO ABUSE/NEGLECT?: NO

## 2024-11-26 SDOH — SOCIAL STABILITY: SOCIAL NETWORK
DO YOU BELONG TO ANY CLUBS OR ORGANIZATIONS SUCH AS CHURCH GROUPS, UNIONS, FRATERNAL OR ATHLETIC GROUPS, OR SCHOOL GROUPS?: NO

## 2024-11-26 SDOH — ECONOMIC STABILITY: HOUSING INSECURITY: IN THE PAST 12 MONTHS, HOW MANY TIMES HAVE YOU MOVED WHERE YOU WERE LIVING?: 0

## 2024-11-26 SDOH — ECONOMIC STABILITY: TRANSPORTATION INSECURITY

## 2024-11-26 SDOH — ECONOMIC STABILITY: TRANSPORTATION INSECURITY
IN THE PAST 12 MONTHS, HAS LACK OF TRANSPORTATION KEPT YOU FROM MEETINGS, WORK, OR FROM GETTING THINGS NEEDED FOR DAILY LIVING?: NO

## 2024-11-26 SDOH — SOCIAL STABILITY: SOCIAL NETWORK: HOW OFTEN DO YOU GET TOGETHER WITH FRIENDS OR RELATIVES?: ONCE A WEEK

## 2024-11-26 SDOH — HEALTH STABILITY: PHYSICAL HEALTH
HOW OFTEN DO YOU NEED TO HAVE SOMEONE HELP YOU WHEN YOU READ INSTRUCTIONS, PAMPHLETS, OR OTHER WRITTEN MATERIAL FROM YOUR DOCTOR OR PHARMACY?: NEVER

## 2024-11-26 SDOH — HEALTH STABILITY: PHYSICAL HEALTH: ON AVERAGE, HOW MANY MINUTES DO YOU ENGAGE IN EXERCISE AT THIS LEVEL?: 0 MIN

## 2024-11-26 SDOH — SOCIAL STABILITY: SOCIAL NETWORK: HOW OFTEN DO YOU ATTEND MEETINGS OF THE CLUBS OR ORGANIZATIONS YOU BELONG TO?: NEVER

## 2024-11-26 SDOH — ECONOMIC STABILITY: HOUSING INSECURITY

## 2024-11-26 SDOH — HEALTH STABILITY: MENTAL HEALTH: HOW MANY DRINKS CONTAINING ALCOHOL DO YOU HAVE ON A TYPICAL DAY WHEN YOU ARE DRINKING?: 1 OR 2

## 2024-11-26 SDOH — ECONOMIC STABILITY: HOUSING INSECURITY: IN THE PAST 12 MONTHS HAS THE ELECTRIC, GAS, OIL, OR WATER COMPANY THREATENED TO SHUT OFF SERVICES IN YOUR HOME?: NO

## 2024-11-26 SDOH — SOCIAL STABILITY: SOCIAL INSECURITY: DO YOU FEEL ANYONE HAS EXPLOITED OR TAKEN ADVANTAGE OF YOU FINANCIALLY OR OF YOUR PERSONAL PROPERTY?: NO

## 2024-11-26 SDOH — SOCIAL STABILITY: SOCIAL INSECURITY: HAVE YOU HAD ANY THOUGHTS OF HARMING ANYONE ELSE?: NO

## 2024-11-26 SDOH — ECONOMIC STABILITY: FOOD INSECURITY: HOW HARD IS IT FOR YOU TO PAY FOR THE VERY BASICS LIKE FOOD, HOUSING, MEDICAL CARE, AND HEATING?: NOT HARD AT ALL

## 2024-11-26 SDOH — ECONOMIC STABILITY: HOUSING INSECURITY: IN THE LAST 12 MONTHS, HOW MANY PLACES HAVE YOU LIVED?: 1

## 2024-11-26 SDOH — HEALTH STABILITY: PHYSICAL HEALTH: ON AVERAGE, HOW MANY DAYS PER WEEK DO YOU ENGAGE IN MODERATE TO STRENUOUS EXERCISE (LIKE A BRISK WALK)?: 0 DAYS

## 2024-11-26 SDOH — SOCIAL STABILITY: SOCIAL INSECURITY: DOES ANYONE TRY TO KEEP YOU FROM HAVING/CONTACTING OTHER FRIENDS OR DOING THINGS OUTSIDE YOUR HOME?: NO

## 2024-11-26 SDOH — SOCIAL STABILITY: SOCIAL INSECURITY: WERE YOU ABLE TO COMPLETE ALL THE BEHAVIORAL HEALTH SCREENINGS?: YES

## 2024-11-26 ASSESSMENT — ACTIVITIES OF DAILY LIVING (ADL)
HEARING - LEFT EAR: FUNCTIONAL
FEEDING YOURSELF: INDEPENDENT
PATIENT'S MEMORY ADEQUATE TO SAFELY COMPLETE DAILY ACTIVITIES?: YES
LACK_OF_TRANSPORTATION: NO
WALKS IN HOME: INDEPENDENT
LACK_OF_TRANSPORTATION: NO
BATHING: INDEPENDENT
HEARING - RIGHT EAR: FUNCTIONAL
TOILETING: INDEPENDENT
JUDGMENT_ADEQUATE_SAFELY_COMPLETE_DAILY_ACTIVITIES: YES
DRESSING YOURSELF: INDEPENDENT
GROOMING: INDEPENDENT
LACK_OF_TRANSPORTATION: NO
ADEQUATE_TO_COMPLETE_ADL: YES

## 2024-11-26 ASSESSMENT — COGNITIVE AND FUNCTIONAL STATUS - GENERAL
PATIENT BASELINE BEDBOUND: NO
DAILY ACTIVITIY SCORE: 24
MOBILITY SCORE: 24
MOBILITY SCORE: 24
DAILY ACTIVITIY SCORE: 24

## 2024-11-26 ASSESSMENT — PAIN DESCRIPTION - LOCATION
LOCATION: PELVIS
LOCATION: ABDOMEN

## 2024-11-26 ASSESSMENT — PAIN - FUNCTIONAL ASSESSMENT
PAIN_FUNCTIONAL_ASSESSMENT: 0-10

## 2024-11-26 ASSESSMENT — LIFESTYLE VARIABLES
SKIP TO QUESTIONS 9-10: 1
SKIP TO QUESTIONS 9-10: 1
HOW OFTEN DO YOU HAVE A DRINK CONTAINING ALCOHOL: MONTHLY OR LESS
AUDIT-C TOTAL SCORE: 1
AUDIT-C TOTAL SCORE: 1
HOW MANY STANDARD DRINKS CONTAINING ALCOHOL DO YOU HAVE ON A TYPICAL DAY: 1 OR 2
AUDIT-C TOTAL SCORE: 1
HOW OFTEN DO YOU HAVE 6 OR MORE DRINKS ON ONE OCCASION: NEVER

## 2024-11-26 ASSESSMENT — PATIENT HEALTH QUESTIONNAIRE - PHQ9
SUM OF ALL RESPONSES TO PHQ9 QUESTIONS 1 & 2: 1
1. LITTLE INTEREST OR PLEASURE IN DOING THINGS: NOT AT ALL
2. FEELING DOWN, DEPRESSED OR HOPELESS: SEVERAL DAYS

## 2024-11-26 ASSESSMENT — PAIN SCALES - GENERAL
PAINLEVEL_OUTOF10: 5 - MODERATE PAIN
PAINLEVEL_OUTOF10: 8
PAINLEVEL_OUTOF10: 6
PAINLEVEL_OUTOF10: 4
PAINLEVEL_OUTOF10: 3
PAINLEVEL_OUTOF10: 6

## 2024-11-26 ASSESSMENT — SOCIAL DETERMINANTS OF HEALTH (SDOH): IN THE PAST 12 MONTHS, HAS THE ELECTRIC, GAS, OIL, OR WATER COMPANY THREATENED TO SHUT OFF SERVICE IN YOUR HOME?: NO

## 2024-11-26 ASSESSMENT — PAIN DESCRIPTION - DESCRIPTORS: DESCRIPTORS: SHARP;CRAMPING

## 2024-11-26 NOTE — ED TRIAGE NOTES
TRIAGE NOTE   I saw the patient as the Clinician in Triage and performed a brief history and physical exam, established acuity, and ordered appropriate tests to develop basic plan of care. Patient will be seen by an DOLLY, resident and/or physician who will independently evaluate the patient. Please see subsequent provider notes for further details and disposition.     Brief HPI: In brief, eMredith Mckenzie is a 29 y.o. female that presents for acute abdominal pain and she is status post 4-1/2 weeks out from a hysterectomy.  She was not feeling any pain she felt she was 90% through the pain from the hysterectomy and all of a sudden developed acute right lower quadrant pain.  She is nauseous but she has not vomited.  She denies vaginal bleeding.  She denies black stool or melena or hematochezia.  She denies chest pain or dyspnea.  She denies any recent trauma or fall.  She has only an allergy to sertraline.  She endorses a headache.  She rates the headache 4 out of 10.  She denies neurologic deficit.  She rates her abdominal pain 9 out of 10 which is intermittent and when it is not as severe as 9 out of 10 it goes as low as 7 out of 10.  She is G0..     Focused Physical exam:   She had a positive McBurney point and positive psoas.  Normal bowel sounds.  Normal S1-S2 and rate.  Clear bilateral lung sounds.  Skin appears to be normal in temperature and color  Plan/MDM:   CT abdomen pelvis was ordered with contrast.  CBC CMP lactate and lipase along with urinalysis ordered.  I ordered 4 mg morphine 4 mg Zofran half liter normal saline and made her n.p.o.  Last time she had any food was at 4 PM.  Please see subsequent provider note for further details and disposition

## 2024-11-26 NOTE — NURSING NOTE
Discharge instructions provided using teach back method. Pt's health related  risk factors discussed with pt. pt educated to look for any worsening sign and symptoms. Pt educated to seek medical attention if experience any medical emergency. Pt aware to follow up with outpatient clinics as scheduled. Home going meds reviewed with pt. Pt verbalized understanding of disposition and discharge instructions. All questions answered to patient's satisfaction and within nursing scope of practice. Vitals stable, IV removed by bedside nurse. Reached out to Dr Bailey to send pts augmentin to meds to beds pharmacy.

## 2024-11-26 NOTE — CARE PLAN
Problem: Pain - Adult  Goal: Verbalizes/displays adequate comfort level or baseline comfort level  Outcome: Progressing     Problem: Safety - Adult  Goal: Free from fall injury  Outcome: Progressing     Problem: Pain  Goal: Takes deep breaths with improved pain control throughout the shift  Outcome: Progressing  Goal: Turns in bed with improved pain control throughout the shift  Outcome: Progressing  Goal: Walks with improved pain control throughout the shift  Outcome: Progressing  Goal: Performs ADL's with improved pain control throughout shift  Outcome: Progressing  Goal: Participates in PT with improved pain control throughout the shift  Outcome: Progressing  Goal: Free from opioid side effects throughout the shift  Outcome: Progressing  Goal: Free from acute confusion related to pain meds throughout the shift  Outcome: Progressing

## 2024-11-26 NOTE — PROGRESS NOTES
Meredith Mckenzie is a 29 y.o. female on day 0 of admission presenting with Abscess.    Spoke with patient to discuss her preferences for care. Discussed how patient manages health at home. Lives home with spouse. Independent in all ADLS, works and drives.  No home O2, dialysis, or assistive devices. Patient denies any problems getting to appointments or obtaining/affording medications.  No additional resources or needs identified.    PCP: through [facility]  Plan: admitted for abdominal pain s/p hysterectomy done 10/24, CT showing post op seroma or evolving hematoma. Awaiting GYN recommendations. Patient started on IV abx, and NPO at this time. States she does have follow up with her surgeon next week on Dec 3.   Status: OBS  Payor: [employer]  Disposition: Home with spouse  Barrier: pain control, GYN recs, iv abx  ADOD:   today/tomorrow       11/26/24 1201   Discharge Planning   Living Arrangements Spouse/significant other   Support Systems Spouse/significant other   Assistance Needed independent, works, drives   Type of Residence Private residence   Number of Stairs to Enter Residence 2   Number of Stairs Within Residence 15   Do you have animals or pets at home? Yes   Type of Animals or Pets mutts   Who is requesting discharge planning? Provider   Home or Post Acute Services None   Expected Discharge Disposition Home   Does the patient need discharge transport arranged? No   Stroke Family Assessment   Stroke Family Assessment Needed No   Intensity of Service   Intensity of Service 0-30 min       Jesusita Gallardo RN

## 2024-11-26 NOTE — CONSULTS
Reason For Consult  Postoperative pelvic pain and fluid collection    History Of Present Illness  Meredith Mckenzie is a 29 y.o. female presenting with increasing pelvic pain.  Patient had a laparoscopic hysterectomy and peritoneal dissection on October 20/4 over 4 weeks ago.  She was recovering as expected and then on Sunday evening started to feel little nausea and pelvic discomfort.  She awoke Monday with increasing pelvic pain.  Denies any vaginal bleeding or discharge.  She denies any heavy lifting or sexual activity.  She denies any urinary symptoms at this time.  She did admit to some dyschezia.  Came to the emergency room and found to be tachycardic and elevated white count.  CT scan showed fluid collection.     Past Medical History  She has a past medical history of Anxiety, Bipolar 2 disorder (Multi), Cystic acne, Hyperlipidemia, Major depressive disorder, recurrent, in partial remission (CMS-HCC) (05/14/2018), and Palpitations.    Surgical History  She has a past surgical history that includes CT angio head w and wo IV contrast (05/31/2019); Adenoidectomy; [place] tooth extraction; and Tympanostomy tube placement.     Social History  She reports that she has never smoked. She has never used smokeless tobacco. She reports current alcohol use of about 1.0 standard drink of alcohol per week. She reports that she does not currently use drugs after having used the following drugs: Marijuana.    Family History  Family History   Problem Relation Name Age of Onset    Anxiety disorder Mother      Depression Mother      Thyroid disease Mother      Cancer Father      Anxiety disorder Mother's Sister      Depression Mother's Sister      Alcohol abuse Maternal Grandfather          Allergies  Sertraline    Review of Systems  Increasing abdominal pain     Physical Exam  Abdomen is soft nondistended bowel sounds positive no masses palpated.  Slightly tender to deep palpation suprapubically with mild peritoneal  "signs    Pelvic exam: External genitalia Bartholin's urethra and Broughton's normal.  Vaginal vault without blood or discharge.  Vaginal cuff intact with no bulging or pressure.     Last Recorded Vitals  Blood pressure 120/85, pulse 70, temperature 36.8 °C (98.2 °F), temperature source Temporal, resp. rate 15, height 1.676 m (5' 6\"), weight 63.5 kg (140 lb), SpO2 97%.    Relevant Results  CT scan and white count reviewed     Assessment/Plan     Postoperative fluid collection with possible early abscess.  Responded well to antibiotics and improving.  Would advise continuing with oral antibiotics as an outpatient  Scheduled for follow-up December 3 and keep that appointment  No need for fluid drainage at this time is that it is less than 7 cm and all indices are improving.  Okay for discharge from gynecologic perspective    I spent 45 minutes in the professional and overall care of this patient.      Amado Larsen MD    "

## 2024-11-26 NOTE — DISCHARGE INSTRUCTIONS
[facility] Observation Unit Discharge Instructions  You came to the hospital with abdominal pain and were admitted for observation and further care.   You were treated with antibiotics.   A gynecologist specialist saw you while admitted and helped manage your care.   Your discharge plan is to go home to recover.    Please see your primary care doctor in 1 week for follow-up.   Please keep your appointment on 12/3 with Dr. Fenton. An antibiotic called Augmentin has been prescribed to you. Please take 875 mg three times a day for 10 days. A pain medication called oxycodone has been prescribed to you. Please take 5 mg every 6 hours as needed for pain. Please follow up with your primary care doctor for further pain management.        See the attached information for education about any new medications and the condition(s) you were treated for.  Your medications may have changed so pay close attention to the list given to you at discharge and ask any questions you have before leaving the hospital.

## 2024-11-26 NOTE — H&P
HPI: Meredith Mckenzie is a 29 y.o. female, with a PMH of HLD, palpitations, anxiety, bipolar disorder, dysmenorrhea, vaginismus, vulvodynia and high tone pelvic floor dysfunction s/p hysterectomy Transvaginal Laparoscopy-Assisted, with bilateral salpingectomy, left ovarian cystectomy, cystoscopy on 10/24/24.  Presented to [facility] ED on 11/25/2024 with complaints of abdominal pain. Patient reports post op course has been uneventful until yesterday morning when she awake with severe abdominal pain. Thought she was maybe constipated but then had 2 bowel movement but pain persisted. Reports nausea/no emesis. Denies any fever or chills. Denies any vaginal discharge/bleeding     ED Course: VSS. Labs notable for WBC of 17.6.   CT a/p -8.9 x 2.1 x 2.7 cm ill-defined low attenuating collection in the pelvis which measures higher than   simple fluid attenuation. No locules of gas are seen within this collection. Findings may relate to postsurgical seroma/evolving hematoma. Sterility of the fluid can not be assessed by imaging. 3.6 cm hypodense lesion in the right adnexa likely related to the right ovary. Left ovary is not well visualized.   Pelvic US -Status post hysterectomy. Nonvisualization of the ovaries. Evaluation is limited due to  transabdominal technique and bowel gas artifact. Pelvic free fluid noted on CT is not well visualized.   Received Morphine x 2, Zofran, Zosyn, 500cc NS bolus   Case discussed with GYN who will see in AM. Agrees to Zosyn.          Patient History   PMH: She has a past medical history of Anxiety, Bipolar 2 disorder (Multi), Cystic acne, Hyperlipidemia, Major depressive disorder, recurrent, in partial remission (CMS-HCC) (05/14/2018), and Palpitations.  PSH: She has a past surgical history that includes CT angio head w and wo IV contrast (05/31/2019); Adenoidectomy; [place] tooth extraction; and Tympanostomy tube placement.  SH: She reports that she has never smoked. She has never used  "smokeless tobacco. She reports current alcohol use of about 1.0 standard drink of alcohol per week. She reports that she does not currently use drugs after having used the following drugs: Marijuana.  FH: family history includes Alcohol abuse in her maternal grandfather; Anxiety disorder in her mother and mother's sister; Cancer in her father; Depression in her mother and mother's sister; Thyroid disease in her mother.     Allergies   Allergen Reactions    Sertraline Nausea/vomiting     Current Outpatient Medications   Medication Instructions    acetaminophen (TYLENOL) 1,000 mg, oral, Every 6 hours    atorvastatin (LIPITOR) 80 mg, Daily    estradiol (Estrace) 0.01 % (0.1 mg/gram) vaginal cream Use nightly for one month. You may use the applicator if you prefer, or you can place a dime-sized amount on your finger and spread over the opening of the vagina. Wash hands well after use.    LORazepam (Ativan) 0.5 mg tablet Take 1 tablet the evening before your surgery as needed for anxiety. If after 1 hour you have no improvement, you may take the 2nd tablet.    NORETHINDRONE, CONTRACEPTIVE, ORAL     ondansetron ODT (ZOFRAN-ODT) 4 mg, oral, Every 6 hours PRN    oxyCODONE (ROXICODONE) 5 mg, oral, Every 6 hours PRN    tretinoin (Retin-A) 0.025 % cream apply pea sized amount to full face 2-3 nights per week, increase to nightly as tolerated. Mix directly with an oil free moisturizer if too drying.     Review of Systems   ROS: 10-point review of systems was performed and is otherwise negative except as noted in HPI.        Heart Rate:  []   Temperature:  [36.9 °C (98.5 °F)]   Respirations:  [18-19]   BP: (121-127)/(72-81)   Height:  [167.6 cm (5' 6\")]   Weight:  [63.5 kg (140 lb)]   Pulse Ox:  [97 %-99 %]      0-10 (Numeric) Pain Score: 8   Vitals:    11/25/24 1857   Weight: 63.5 kg (140 lb)        Physical Exam:  Vitals and nursing notes reviewed.  GENERAL: Alert and awake, cooperative; in no acute distress  SKIN: Warm " and dry, cap refill <2  HEENT: Normocephalic, PEERL, mucous membranes pink and moist  CARDIAC: Regular rate and rhythm, S1S2, no murmurs or abnormal heart sounds  CHEST: Normal respiratory effort, no abnormal breath sounds  ABDOMEN: soft, mildly distended, diffusely tender/worse in RLQ  EXTREMITIES: No lower extremity edema, normal pulses all 4 extremities  NEURO: Alert and oriented, mental status at baseline, no focal deficits  PSYCH: Behavior and affect as expected     Medications      Diagnostic Results   CBC- 11/25/2024:  8:27 PM  17.6 13.9 304    40.6      BMP- 11/25/2024:  8:27 PM  136 15 _ 101   3.9 0.64 26    Estimated Creatinine Clearance: 120.3 mL/min (by C-G formula based on SCr of 0.64 mg/dL).     CA: 9.2 PROTIEN: 7.6 ALT: 16 Total Bili: 0.7 Mg: _   PHOS: _ ALBUMIN: 4.5 AST: 14   Alk Phos: 50      COAGS- No results in last year.  _   _ _     Pertinent Imaging  US pelvis    Result Date: 11/25/2024  Interpreted By:  Lisbeth Delacruz, STUDY: US PELVIS;  11/25/2024 11:24 pm   INDICATION: Signs/Symptoms:adnexial lesion on CT.   COMPARISON: None.   ACCESSION NUMBER(S): [id number]   ORDERING CLINICIAN: AMARILIS DURBIN   TECHNIQUE: Grayscale and color Doppler  transabdominal images of the pelvis. Patient declined transvaginal imaging.   FINDINGS: Uterus: Status post hysterectomy.   Ovaries: Bilateral ovaries are not visualized due to bowel gas artifact.   Cul de sac: Pelvic free fluid noted on CT is not well visualized by sonography.       Status post hysterectomy.   Nonvisualization of the ovaries. Evaluation is limited due to transabdominal technique and bowel gas artifact.   Pelvic free fluid noted on CT is not well visualized.   MACRO: None   Signed by: Lisbeth Delacruz 11/25/2024 11:50 PM Dictation workstation:   [id number]    CT abdomen pelvis w IV contrast    Result Date: 11/25/2024  Interpreted By:  Lisbeth Delacruz, STUDY: CT ABDOMEN PELVIS W IV CONTRAST;  11/25/2024 9:53 pm   INDICATION:  Signs/Symptoms:positive psoas and mcburney point, recent hysterectomy 4 1/2 weeks ago.   COMPARISON: None.   ACCESSION NUMBER(S): [id number]   ORDERING CLINICIAN: LY GAMBINO   TECHNIQUE: Axial CT images of the abdomen and pelvis with coronal and sagittal reconstructed images obtained after intravenous administration of 75 mL of Omnipaque 350   FINDINGS: LOWER CHEST: No acute abnormality of the lung bases.   ABDOMEN:   LIVER: Normal morphology and attenuation. Subcentimeter hypodense focus in the right hepatic lobe is too small to characterize. BILE DUCTS: Normal caliber. GALLBLADDER: No calcified gallstones. No wall thickening. PANCREAS: Within normal limits. SPLEEN: Within normal limits. ADRENALS: Within normal limits. KIDNEYS and URETERS: Symmetric renal enhancement. No hydronephrosis or perinephric fluid collection.   VESSELS:   No aortic aneurysm. RETROPERITONEUM: No pathologically enlarged retroperitoneal lymph nodes.   PELVIS:   REPRODUCTIVE ORGANS: Status post hysterectomy. There is a 3.6 cm hypodense lesion in the right adnexa likely relate to right ovary. Left ovary is suboptimally visualized.. BLADDER: Partially distended with apparent wall thickening.   BOWEL: No dilated bowel. Appendix is best visualized on axial image 111 of series 604 and coronal image 45 of series 602. Visualized appendix is unremarkable. Moderate to large stool burden. PERITONEUM: There is a 8.9 x 2.1 2.7 cm ill-defined fluid in the pelvis measuring higher than simple fluid attenuation. No locules of gas are seen within this collection.   ABDOMINAL WALL: Within normal limits. BONES: No acute osseous abnormality.       Status post hysterectomy. There is a 8.9 x 2.1 x 2.7 cm ill-defined low attenuating collection in the pelvis which measures higher than simple fluid attenuation. No locules of gas are seen within this collection. Findings may relate to postsurgical seroma/evolving hematoma. Sterility of the fluid can not be  assessed by imaging. Correlate clinically for the need for further evaluation.   There is a 3.6 cm hypodense lesion in the right adnexa likely related to the right ovary. Left ovary is not well visualized. If there is clinical concern for ovarian pathology further evaluation with dedicated ultrasound can be considered.   Additional findings as described above.   MACRO: None   Signed by: Lisbeth Delacruz 11/25/2024 10:11 PM Dictation workstation:   [id number]            Assessment/Plan   Meredith Mckenzie is a 29 y.o. female, with a PMH of HLD, palpitations, anxiety, bipolar disorder, dysmenorrhea, vaginismus, vulvodynia and high tone pelvic floor dysfunction s/p hysterectomy Transvaginal Laparoscopy-Assisted, with bilateral salpingectomy, left ovarian cystectomy, cystoscopy on 10/24/24.  Presented to [facility] ED on 11/25/2024 with complaints of abdominal pain. Patient reports post op course has been uneventful until yesterday morning when she awake with severe abdominal pain. Thought she was maybe constipated but then had 2 bowel movement but pain persisted. Reports nausea/no emesis. Denies any fever or chills. Denies any vaginal discharge/bleeding     ED Course: VSS. Labs notable for WBC of 17.6.   CT a/p -8.9 x 2.1 x 2.7 cm ill-defined low attenuating collection in the pelvis which measures higher than   simple fluid attenuation. No locules of gas are seen within this collection. Findings may relate to postsurgical seroma/evolving hematoma. Sterility of the fluid can not be assessed by imaging. 3.6 cm hypodense lesion in the right adnexa likely related to the right ovary. Left ovary is not well visualized.   Pelvic US -Status post hysterectomy. Nonvisualization of the ovaries. Evaluation is limited due to  transabdominal technique and bowel gas artifact. Pelvic free fluid noted on CT is not well visualized.   Received Morphine x 2, Zofran, Zosyn, 500cc NS bolus   Case discussed with GYN who will see in AM. Agrees to  Zosyn.     Abdominal pain and pelvic fluid collection s/p hysterectomy, possible abscess   -IV atb  -prn analgesics/antiemetics  -NPO until consulting services see in AM  -GYN consult    VTE PPX: low risk, encourage ambulation    Chart, medical history, and labs/testing reviewed in detail.     Disposition: Discharge home once medically cleared and stable, pending input from GYN/ +/- intervention vs management with atb     CAESAR Dowd-CNP   Observation/Internal Med [facility]  [facility]  11/26/24  1:32 AM  Total time of 45 minutes spent on professional and overall care, with >50% of time dedicated to counseling/coordination of care.

## 2024-11-26 NOTE — ED PROVIDER NOTES
HPI     CC: Abdominal Pain     HPI: Meredith Mckenzie is a 29 y.o. female with a history of endometriosis, HLD, vaginismus, depression and anxiety, 4.5 weeks postop from ARLINE and left ovarian cystectomy (Dr. Fenton, 10/24/24), who presents with abdominal pain.  Patient states that for the past 2 weeks she has been getting better and better until this morning when she developed severe lower abdominal pain across her lower abdomen but worst in the right lower quadrant.  She thought she might be constipated but then had 2-3 bowel movements and symptoms did not resolve.  She has some associated lightheadedness and nausea.  She denies fevers, chills, dysuria, hematuria, chest pain, shortness of breath.  She has been taking ibuprofen for pain with no relief.    ROS: 10-point review of systems was performed and is otherwise negative except as noted in HPI.    Limitations to history: N/A    Independent Historians: N/A    External Records Reviewed: Outpatient notes in EMR    Past Medical History: Noncontributory except per HPI     Past Surgical History: Noncontributory except per HPI     Family History: Reviewed and noncontributory     Social History:  Denies tobacco. Denies ETOH. Denies illicit drugs.    Social Determinants Affecting Care: N/A    Allergies   Allergen Reactions    Sertraline Nausea/vomiting       Home Meds:   Current Outpatient Medications   Medication Instructions    acetaminophen (TYLENOL) 1,000 mg, oral, Every 6 hours    atorvastatin (LIPITOR) 80 mg, Daily    estradiol (Estrace) 0.01 % (0.1 mg/gram) vaginal cream Use nightly for one month. You may use the applicator if you prefer, or you can place a dime-sized amount on your finger and spread over the opening of the vagina. Wash hands well after use.    LORazepam (Ativan) 0.5 mg tablet Take 1 tablet the evening before your surgery as needed for anxiety. If after 1 hour you have no improvement, you may take the 2nd tablet.    NORETHINDRONE, CONTRACEPTIVE,  "ORAL     ondansetron ODT (ZOFRAN-ODT) 4 mg, oral, Every 6 hours PRN    oxyCODONE (ROXICODONE) 5 mg, oral, Every 6 hours PRN    tretinoin (Retin-A) 0.025 % cream apply pea sized amount to full face 2-3 nights per week, increase to nightly as tolerated. Mix directly with an oil free moisturizer if too drying.        Physical Exam     ED Triage Vitals [11/25/24 1857]   Temperature Heart Rate Respirations BP   36.9 °C (98.5 °F) (!) 101 18 121/81      Pulse Ox Temp Source Heart Rate Source Patient Position   98 % Temporal Monitor --      BP Location FiO2 (%)     -- --         Heart Rate:  []   Temperature:  [36.9 °C (98.5 °F)]   Respirations:  [18]   BP: (121)/(72-81)   Height:  [167.6 cm (5' 6\")]   Weight:  [63.5 kg (140 lb)]   Pulse Ox:  [98 %-99 %]      Physical Exam  Vitals and nursing note reviewed.     CONSTITUTIONAL: Well appearing, well nourished, in no acute distress.   HENMT: Head atraumatic. Airway patent. Nasal mucosa clear. Mouth with normal mucosa, clear oropharynx. Uvula midline. Neck supple.    EYES: Clear bilaterally, pupils equally round and reactive to light.   CARDIOVASCULAR: Normal rate, regular rhythm.  Heart sounds S1, S2.  No murmurs, rubs or gallops. Normal pulses. Capillary refill < 2 sec.   RESPIRATORY: No increased work of breathing. Breath sounds clear and equal bilaterally.  GASTROINTESTINAL: Abdomen soft, non-distended, diffusely tender across lower abdomen, most notably in the RLQ with guarding. No rebound. Normal bowel sounds. No palpable masses.  GENITOURINARY:  No CVA tenderness.  MUSCULOSKELETAL: Spine appears normal, range of motion is not limited, no muscle or joint tenderness. No edema.   NEUROLOGICAL: Alert and oriented, no asymmetry, moving all extremities equally.  SKIN: Warm, dry and intact. No rash or notable lesions.  PSYCHIATRIC: Normal mood and affect.  HEME/LYMPH: No adenopathy or splenomegaly.    Diagnostic Results      ECG: ECGs read and interpreted by me. See ED " Course, below, for interpretation.    Labs Reviewed   CBC WITH AUTO DIFFERENTIAL - Abnormal       Result Value    WBC 17.6 (*)     nRBC 0.0      RBC 4.37      Hemoglobin 13.9      Hematocrit 40.6      MCV 93      MCH 31.8      MCHC 34.2      RDW 12.1      Platelets 304      Neutrophils % 75.6      Immature Granulocytes %, Automated 0.4      Lymphocytes % 15.3      Monocytes % 8.0      Eosinophils % 0.5      Basophils % 0.2      Neutrophils Absolute 13.26 (*)     Immature Granulocytes Absolute, Automated 0.07      Lymphocytes Absolute 2.69      Monocytes Absolute 1.40 (*)     Eosinophils Absolute 0.09      Basophils Absolute 0.04     COMPREHENSIVE METABOLIC PANEL - Abnormal    Glucose 101 (*)     Sodium 136      Potassium 3.9      Chloride 103      Bicarbonate 26      Anion Gap 11      Urea Nitrogen 15      Creatinine 0.64      eGFR >90      Calcium 9.2      Albumin 4.5      Alkaline Phosphatase 50      Total Protein 7.6      AST 14      Bilirubin, Total 0.7      ALT 16     LACTATE - Normal    Lactate 1.5      Narrative:     Venipuncture immediately after or during the administration of Metamizole may lead to falsely low results. Testing should be performed immediately prior to Metamizole dosing.   LIPASE - Normal    Lipase 21      Narrative:     Venipuncture immediately after or during the administration of Metamizole may lead to falsely low results. Testing should be performed immediately prior to Metamizole dosing.   URINALYSIS WITH REFLEX CULTURE AND MICROSCOPIC    Narrative:     The following orders were created for panel order Urinalysis with Reflex Culture and Microscopic.  Procedure                               Abnormality         Status                     ---------                               -----------         ------                     Urinalysis with Reflex C...[[id number]]                                                 Extra Urine Gray Tube[[id number]]                                                          Please view results for these tests on the individual orders.   URINALYSIS WITH REFLEX CULTURE AND MICROSCOPIC   EXTRA URINE GRAY TUBE         CT abdomen pelvis w IV contrast   Final Result   Status post hysterectomy. There is a 8.9 x 2.1 x 2.7 cm ill-defined   low attenuating collection in the pelvis which measures higher than   simple fluid attenuation. No locules of gas are seen within this   collection. Findings may relate to postsurgical seroma/evolving   hematoma. Sterility of the fluid can not be assessed by imaging.   Correlate clinically for the need for further evaluation.        There is a 3.6 cm hypodense lesion in the right adnexa likely related   to the right ovary. Left ovary is not well visualized. If there is   clinical concern for ovarian pathology further evaluation with   dedicated ultrasound can be considered.        Additional findings as described above.        MACRO:   None        Signed by: Lisbeth Delacruz 11/25/2024 10:11 PM   Dictation workstation:   [id number]      US pelvis    (Results Pending)                 Artie Coma Scale Score: 15                  Procedure  Procedures    ED Course & MDM   Assessment/Plan:   Meredith Mckenzie is a 29 y.o. female with a history of endometriosis, HLD, vaginismus, depression and anxiety, 4.5 weeks postop from ARLINE and left ovarian cystectomy (Dr. Fenton, 10/24/24), who presents with lower abdominal pain worse in the RLQ.  She is hemodynamically stable and well-appearing.  Differential includes appendicitis, ovarian cyst, postop complication such as fluid collection or SBO.  Workup was initiated by triage provider prior to my seeing her with labs and CTAP.  Labs notable for leukocytosis 17.6, normal H&H, normal platelets, normal CMP, normal lipase and lactate.  Urinalysis is pending.  CTAP is concerning for 8.9 cm collection in the pelvis higher attenuation than simple fluid, possible postop seroma/evolving hematoma or abscess.  There is also a  3.6 cm hypodense lesion in the right adnexa likely related to the right ovary.  Left ovary is not well-visualized.  A pelvic ultrasound was ordered.  Will engage OB/GYN for further recommendations. See below for details of ED course and ultimate disposition.    Medications   piperacillin-tazobactam (Zosyn) 3.375 g in dextrose (iso) IV 50 mL (has no administration in time range)   morphine injection 4 mg (4 mg intravenous Given 11/25/24 2236)   ondansetron (Zofran) injection 4 mg (4 mg intravenous Given 11/25/24 2236)   sodium chloride 0.9 % bolus 500 mL (500 mL intravenous New Bag 11/25/24 2236)   iohexol (OMNIPaque) 350 mg iodine/mL solution 75 mL (75 mL intravenous Given 11/25/24 2138)        ED Course as of 11/26/24 0746 Mon Nov 25, 2024 2321 I spoke with Dr. Larsen who agrees that admission to medicine with IV antibiotics is appropriate. They will see in the AM.  [CG]   2357 TAUS tatus post hysterectomy. Nonvisualization of the ovaries. Evaluation is limited due to transabdominal technique and bowel gas artifact. Pelvic free fluid noted on CT is not well visualized.  [CG]      ED Course User Index  [CG] Christiana Kirby MD         Diagnoses as of 11/26/24 0746   Intra-abdominal fluid collection   Lower abdominal pain       Disposition:   Admitted to OBS, discussed differential and findings with patient as well as any family members at bedside.      ED Prescriptions    None         Christiana Kirby MD  EM/IM/Peds    This note was dictated by speech recognition. Minor errors in transcription may be present.     Christiana Kirby MD  11/26/24 0746

## 2024-11-27 NOTE — DISCHARGE SUMMARY
Discharge Diagnosis  Abscess    Issues Requiring Follow-Up  PCP, GYN    Discharge Meds     Medication List      START taking these medications     amoxicillin-pot clavulanate 875-125 mg tablet; Commonly known as:   Augmentin; Take 1 tablet (875 mg) by mouth 2 times a day for 10 days.   senna 8.6 mg tablet; Generic drug: sennosides; Take 2 tablets (17.2 mg)   by mouth once daily for 3 days.     CONTINUE taking these medications     acetaminophen 500 mg tablet; Commonly known as: Tylenol; Take 2 tablets   (1,000 mg) by mouth every 6 hours.   atorvastatin 80 mg tablet; Commonly known as: Lipitor   estradiol 0.01 % (0.1 mg/gram) vaginal cream; Commonly known as:   Estrace; Use nightly for one month. You may use the applicator if you   prefer, or you can place a dime-sized amount on your finger and spread   over the opening of the vagina. Wash hands well after use.   LORazepam 0.5 mg tablet; Commonly known as: Ativan; Take 1 tablet the   evening before your surgery as needed for anxiety. If after 1 hour you   have no improvement, you may take the 2nd tablet.   norethindrone 0.35 mg tablet; Commonly known as: Micronor   ondansetron ODT 4 mg disintegrating tablet; Commonly known as:   Zofran-ODT; Take 1 tablet (4 mg) by mouth every 6 hours if needed for   nausea or vomiting.   oxyCODONE 5 mg immediate release tablet; Commonly known as: Roxicodone;   Take 1 tablet (5 mg) by mouth every 6 hours if needed for severe pain (7 -   10) for up to 3 days.   tretinoin 0.025 % cream; Commonly known as: Retin-A       Test Results Pending At Discharge  Pending Labs       Order Current Status    Extra Urine Gray Tube Collected (11/25/24 2022)    Urinalysis with Reflex Culture and Microscopic Collected (11/25/24 2022)            Hospital Course  Meredith Mckenzie is a 29 y.o. female, with a PMH of HLD, palpitations, anxiety, bipolar disorder, dysmenorrhea, vaginismus, vulvodynia and high tone pelvic floor dysfunction s/p hysterectomy  Transvaginal Laparoscopy-Assisted, with bilateral salpingectomy, left ovarian cystectomy, cystoscopy on 10/24/24.  Presented to [facility] ED on 11/25/2024 with complaints of abdominal pain. Patient reports post op course has been uneventful until yesterday morning when she awake with severe abdominal pain. Thought she was maybe constipated but then had 2 bowel movement but pain persisted. Reports nausea/no emesis. Denies any fever or chills. Denies any vaginal discharge/bleeding.    CT a/p -8.9 x 2.1 x 2.7 cm ill-defined low attenuating collection in the pelvis which measures higher than   simple fluid attenuation. No locules of gas are seen within this collection. Findings may relate to postsurgical seroma/evolving hematoma. Sterility of the fluid can not be assessed by imaging. 3.6 cm hypodense lesion in the right adnexa likely related to the right ovary. Left ovary is not well visualized.   Pelvic US -Status post hysterectomy. Nonvisualization of the ovaries. Evaluation is limited due to  transabdominal technique and bowel gas artifact. Pelvic free fluid noted on CT is not well visualized.     Abdominal pain and pelvic fluid collection s/p hysterectomy, possible abscess   -GYN consulted while patient admitted and recommended no indication for fluid drainage at this time given fluid collection is less than 7 cm.  -Pt discharged home with Augmentin 875 mg BID for 10 days, oxycodone 5 mg q 6 hrs X 3 days for pain, and senna  -Instructed patient to keep her appointment with Dr. Fenton on 12/3 and to follow up with her PCP within one week    All labs, vitals, and imaging reviewed prior to discharge. Patient stable at time of discharge.    Both plan and discharge discussed with Dr. Bailey and the interdisciplinary team.    Pertinent Physical Exam At Time of Discharge  Physical Exam  Constitutional:       Appearance: Normal appearance.   Cardiovascular:      Rate and Rhythm: Normal rate and regular rhythm.   Pulmonary:       Effort: Pulmonary effort is normal.      Breath sounds: Normal breath sounds.   Abdominal:      General: Abdomen is flat.      Palpations: Abdomen is soft.      Tenderness: There is abdominal tenderness in the right lower quadrant.      Comments: Diffusely TTP, w/ most tender point in RLQ   Skin:     General: Skin is warm and dry.   Neurological:      Mental Status: She is alert.         Outpatient Follow-Up  Future Appointments   Date Time Provider Department Center   12/3/2024  1:00 PM Claritza Fenton MD [id number]         Mallorie Dickson PA-C

## 2024-11-29 ENCOUNTER — TELEPHONE (OUTPATIENT)
Dept: OBSTETRICS AND GYNECOLOGY | Facility: HOSPITAL | Age: 29
End: 2024-11-29
Payer: COMMERCIAL

## 2024-11-29 DIAGNOSIS — R30.0 DYSURIA: Primary | ICD-10-CM

## 2024-11-29 RX ORDER — PHENAZOPYRIDINE HYDROCHLORIDE 100 MG/1
100 TABLET, FILM COATED ORAL 3 TIMES DAILY PRN
Qty: 10 TABLET | Refills: 0 | Status: SHIPPED | OUTPATIENT
Start: 2024-11-29 | End: 2024-12-02

## 2024-11-30 ENCOUNTER — LAB (OUTPATIENT)
Dept: LAB | Facility: LAB | Age: 29
End: 2024-11-30
Payer: COMMERCIAL

## 2024-11-30 DIAGNOSIS — R19.7 DIARRHEA, UNSPECIFIED TYPE: Primary | ICD-10-CM

## 2024-11-30 DIAGNOSIS — R30.0 DYSURIA: ICD-10-CM

## 2024-11-30 PROCEDURE — 81003 URINALYSIS AUTO W/O SCOPE: CPT

## 2024-11-30 NOTE — TELEPHONE ENCOUNTER
Meredith Mckenzie is a 29 y.o. year old female patient s/p rTLH, excision of endometriosis on 10/24/24 with Dr. Fenton, complicated by pelvic fluid collection requiring admission with IV antibiotics 11/25-27/2024, discharged on augmentin.     She reports taking augmentin, oxicodone and laxative as prescribed since discharge on Tuesday. Developd diarrhea yesterday, and has had approx hourly bowel movements for 27-28h. She has taken 3 doses of immodium since this afternoon with slight improvement, has now also added pepto bismol. She denies lightheadedness or other symptoms of dehydration, feels she is keeping up with fluids.     She also reports terminal dysuria, with some pinching/cramping pain at the end of her urine stream. She was taking pyridium which improved symptoms 75% but did not completely resolve, ran out of this Rx a couple days ago. Reports increased urgency and frequency.    Reviewed continued immodium per dosing instruction on box, OK to add pepto. Discussed maintaining hydration. Encouraged to call/present to ED should she develop symptoms of dehydration.     In regards to urinary symptoms, recommend dropping off urine first thing in the morning, and pyridium Rx sent to pharmacy. To contact on-call provider if not improved tomorrow AM.    Austin Thomas MD

## 2024-11-30 NOTE — PROGRESS NOTES
Patient called answering service due to persistent diarrhea. Spoke with on call doctor last night, pt is s/p TLH, excision of endo 10/24 with subsequent readmission for IV abx for fluid collection, discharged on 11/27 on augmentin. Has now had 24 hrs+ diarrhea, reports now about every hour, often low volume but has had some higher volume. Feels like she is able to adequately hydrate, drinking lots of fluids and electrolytes, tolerating PO. Has taken max dose Imodium without relief. Dropped off urine this AM for urinalysis and is getting some urinary relief with Pyrdium. Denies fevers, chills, nausea currently.     Discussed stopping Imodium, concern for possible C diff. Reviewed option to come to ED if she is concerned about dehydration, worsening abd pain, or change in discharge.     Outpatient order for C diff placed, but discussed if diarrhea worsens or does not improve, come to ED for evaluation and IVF.     Has follow up visit 12/3 with Dr. Fenton.

## 2024-12-01 ENCOUNTER — HOSPITAL ENCOUNTER (EMERGENCY)
Facility: HOSPITAL | Age: 29
Discharge: HOME | End: 2024-12-01
Attending: STUDENT IN AN ORGANIZED HEALTH CARE EDUCATION/TRAINING PROGRAM
Payer: COMMERCIAL

## 2024-12-01 ENCOUNTER — APPOINTMENT (OUTPATIENT)
Dept: RADIOLOGY | Facility: HOSPITAL | Age: 29
End: 2024-12-01
Payer: COMMERCIAL

## 2024-12-01 VITALS
SYSTOLIC BLOOD PRESSURE: 130 MMHG | WEIGHT: 140 LBS | OXYGEN SATURATION: 98 % | BODY MASS INDEX: 22.5 KG/M2 | RESPIRATION RATE: 18 BRPM | HEART RATE: 77 BPM | HEIGHT: 66 IN | DIASTOLIC BLOOD PRESSURE: 73 MMHG | TEMPERATURE: 98.1 F

## 2024-12-01 DIAGNOSIS — R18.8 ABDOMINAL FLUID COLLECTION: ICD-10-CM

## 2024-12-01 DIAGNOSIS — R10.30 LOWER ABDOMINAL PAIN: ICD-10-CM

## 2024-12-01 DIAGNOSIS — R19.7 DIARRHEA, UNSPECIFIED TYPE: Primary | ICD-10-CM

## 2024-12-01 LAB
ALBUMIN SERPL BCP-MCNC: 4.1 G/DL (ref 3.4–5)
ALP SERPL-CCNC: 47 U/L (ref 33–110)
ALT SERPL W P-5'-P-CCNC: 15 U/L (ref 7–45)
ANION GAP SERPL CALC-SCNC: 12 MMOL/L (ref 10–20)
APPEARANCE UR: CLEAR
APPEARANCE UR: CLEAR
AST SERPL W P-5'-P-CCNC: 15 U/L (ref 9–39)
BASOPHILS # BLD AUTO: 0.06 X10*3/UL (ref 0–0.1)
BASOPHILS NFR BLD AUTO: 0.6 %
BILIRUB SERPL-MCNC: 0.4 MG/DL (ref 0–1.2)
BILIRUB UR STRIP.AUTO-MCNC: NEGATIVE MG/DL
BILIRUB UR STRIP.AUTO-MCNC: NEGATIVE MG/DL
BUN SERPL-MCNC: 15 MG/DL (ref 6–23)
C DIF TOX TCDA+TCDB STL QL NAA+PROBE: NOT DETECTED
CALCIUM SERPL-MCNC: 9.3 MG/DL (ref 8.6–10.3)
CHLORIDE SERPL-SCNC: 102 MMOL/L (ref 98–107)
CO2 SERPL-SCNC: 27 MMOL/L (ref 21–32)
COLOR UR: YELLOW
COLOR UR: YELLOW
CREAT SERPL-MCNC: 0.73 MG/DL (ref 0.5–1.05)
EGFRCR SERPLBLD CKD-EPI 2021: >90 ML/MIN/1.73M*2
EOSINOPHIL # BLD AUTO: 0.06 X10*3/UL (ref 0–0.7)
EOSINOPHIL NFR BLD AUTO: 0.6 %
ERYTHROCYTE [DISTWIDTH] IN BLOOD BY AUTOMATED COUNT: 12 % (ref 11.5–14.5)
GLUCOSE SERPL-MCNC: 134 MG/DL (ref 74–99)
GLUCOSE UR STRIP.AUTO-MCNC: NORMAL MG/DL
GLUCOSE UR STRIP.AUTO-MCNC: NORMAL MG/DL
HCT VFR BLD AUTO: 39.4 % (ref 36–46)
HGB BLD-MCNC: 13 G/DL (ref 12–16)
HOLD SPECIMEN: NORMAL
IMM GRANULOCYTES # BLD AUTO: 0.03 X10*3/UL (ref 0–0.7)
IMM GRANULOCYTES NFR BLD AUTO: 0.3 % (ref 0–0.9)
KETONES UR STRIP.AUTO-MCNC: NEGATIVE MG/DL
KETONES UR STRIP.AUTO-MCNC: NEGATIVE MG/DL
LEUKOCYTE ESTERASE UR QL STRIP.AUTO: NEGATIVE
LEUKOCYTE ESTERASE UR QL STRIP.AUTO: NEGATIVE
LIPASE SERPL-CCNC: 15 U/L (ref 9–82)
LYMPHOCYTES # BLD AUTO: 1.5 X10*3/UL (ref 1.2–4.8)
LYMPHOCYTES NFR BLD AUTO: 13.8 %
MAGNESIUM SERPL-MCNC: 1.87 MG/DL (ref 1.6–2.4)
MCH RBC QN AUTO: 31.2 PG (ref 26–34)
MCHC RBC AUTO-ENTMCNC: 33 G/DL (ref 32–36)
MCV RBC AUTO: 95 FL (ref 80–100)
MONOCYTES # BLD AUTO: 0.64 X10*3/UL (ref 0.1–1)
MONOCYTES NFR BLD AUTO: 5.9 %
NEUTROPHILS # BLD AUTO: 8.61 X10*3/UL (ref 1.2–7.7)
NEUTROPHILS NFR BLD AUTO: 78.8 %
NITRITE UR QL STRIP.AUTO: NEGATIVE
NITRITE UR QL STRIP.AUTO: NEGATIVE
NRBC BLD-RTO: 0 /100 WBCS (ref 0–0)
PH UR STRIP.AUTO: 7 [PH]
PH UR STRIP.AUTO: 7 [PH]
PLATELET # BLD AUTO: 372 X10*3/UL (ref 150–450)
POTASSIUM SERPL-SCNC: 4.1 MMOL/L (ref 3.5–5.3)
PROT SERPL-MCNC: 7.6 G/DL (ref 6.4–8.2)
PROT UR STRIP.AUTO-MCNC: NEGATIVE MG/DL
PROT UR STRIP.AUTO-MCNC: NEGATIVE MG/DL
RBC # BLD AUTO: 4.17 X10*6/UL (ref 4–5.2)
RBC # UR STRIP.AUTO: NEGATIVE /UL
RBC # UR STRIP.AUTO: NEGATIVE /UL
SODIUM SERPL-SCNC: 137 MMOL/L (ref 136–145)
SP GR UR STRIP.AUTO: 1.01
SP GR UR STRIP.AUTO: 1.04
UROBILINOGEN UR STRIP.AUTO-MCNC: NORMAL MG/DL
UROBILINOGEN UR STRIP.AUTO-MCNC: NORMAL MG/DL
WBC # BLD AUTO: 10.9 X10*3/UL (ref 4.4–11.3)

## 2024-12-01 PROCEDURE — 99284 EMERGENCY DEPT VISIT MOD MDM: CPT | Mod: 25

## 2024-12-01 PROCEDURE — 81003 URINALYSIS AUTO W/O SCOPE: CPT | Performed by: STUDENT IN AN ORGANIZED HEALTH CARE EDUCATION/TRAINING PROGRAM

## 2024-12-01 PROCEDURE — 36415 COLL VENOUS BLD VENIPUNCTURE: CPT | Performed by: STUDENT IN AN ORGANIZED HEALTH CARE EDUCATION/TRAINING PROGRAM

## 2024-12-01 PROCEDURE — 74177 CT ABD & PELVIS W/CONTRAST: CPT

## 2024-12-01 PROCEDURE — 87493 C DIFF AMPLIFIED PROBE: CPT | Mod: AHULAB | Performed by: STUDENT IN AN ORGANIZED HEALTH CARE EDUCATION/TRAINING PROGRAM

## 2024-12-01 PROCEDURE — 83690 ASSAY OF LIPASE: CPT | Performed by: STUDENT IN AN ORGANIZED HEALTH CARE EDUCATION/TRAINING PROGRAM

## 2024-12-01 PROCEDURE — 83735 ASSAY OF MAGNESIUM: CPT | Performed by: STUDENT IN AN ORGANIZED HEALTH CARE EDUCATION/TRAINING PROGRAM

## 2024-12-01 PROCEDURE — 2550000001 HC RX 255 CONTRASTS: Performed by: STUDENT IN AN ORGANIZED HEALTH CARE EDUCATION/TRAINING PROGRAM

## 2024-12-01 PROCEDURE — 85025 COMPLETE CBC W/AUTO DIFF WBC: CPT | Performed by: STUDENT IN AN ORGANIZED HEALTH CARE EDUCATION/TRAINING PROGRAM

## 2024-12-01 PROCEDURE — 80053 COMPREHEN METABOLIC PANEL: CPT | Performed by: STUDENT IN AN ORGANIZED HEALTH CARE EDUCATION/TRAINING PROGRAM

## 2024-12-01 PROCEDURE — 74177 CT ABD & PELVIS W/CONTRAST: CPT | Mod: FOREIGN READ | Performed by: RADIOLOGY

## 2024-12-01 ASSESSMENT — PAIN DESCRIPTION - FREQUENCY: FREQUENCY: CONSTANT/CONTINUOUS

## 2024-12-01 ASSESSMENT — PAIN DESCRIPTION - LOCATION: LOCATION: ABDOMEN

## 2024-12-01 ASSESSMENT — PAIN DESCRIPTION - PROGRESSION: CLINICAL_PROGRESSION: GRADUALLY IMPROVING

## 2024-12-01 ASSESSMENT — PAIN - FUNCTIONAL ASSESSMENT: PAIN_FUNCTIONAL_ASSESSMENT: 0-10

## 2024-12-01 ASSESSMENT — PAIN SCALES - GENERAL: PAINLEVEL_OUTOF10: 4

## 2024-12-01 ASSESSMENT — PAIN DESCRIPTION - ORIENTATION: ORIENTATION: LOWER

## 2024-12-01 ASSESSMENT — PAIN DESCRIPTION - PAIN TYPE: TYPE: ACUTE PAIN

## 2024-12-01 ASSESSMENT — PAIN DESCRIPTION - DESCRIPTORS
DESCRIPTORS: ACHING
DESCRIPTORS: ACHING

## 2024-12-01 ASSESSMENT — PAIN DESCRIPTION - ONSET: ONSET: ONGOING

## 2024-12-01 NOTE — ED TRIAGE NOTES
PT is five weeks post-op for hysterectomy. Pt recently admitted on Monday for fluid collection in lower abd. Pt sent home with antibiotics and pain meds. PT now having diarrhea and abdominal pain with bloating. No blood in stool. Endorses nausea. Denies chest pain or sob.

## 2024-12-01 NOTE — DISCHARGE INSTRUCTIONS
Take all antibiotics until gone.  Do not share your medication with anyone.    Abdominal Pain Instructions: Return to the ED if the stomach pain worsens, is still there in 12-24 hours, if it moves to the right lower part of the stomach, or if you can't keep down liquids.

## 2024-12-01 NOTE — ED PROVIDER NOTES
Emergency Department Provider Note        History of Present Illness     Chief Complaint: Abdominal Pain and Diarrhea   History provided by: Patient  Limitations to History: None  External Chart Review: See ED Course    HPI:  Meredith Mckenzie is a 29 y.o. female with PMHx of recent laparoscopic assisted total hysterectomy with postoperative course complicated by intra-abdominal fluid collection presenting to the ED for lower abdominal pain and diarrhea.  Patient reports over the past few days she has had numerous episodes of watery, nonbloody diarrhea.  She has been taking a course of Augmentin since her hospital discharge last week for an intra-abdominal fluid collection.  She denies fevers or chills.  Denies nausea, vomiting, dysuria, urinary frequency urgency.  Reports that she continues to have an aching lower abdominal pain that has somewhat gotten worse with her diarrhea.      Physical Exam   Triage vitals:  T 36.7 °C (98.1 °F)  HR (!) 101  /72  RR 18  O2 99 % None (Room air)    Constitutional: Awake, alert, not in acute distress  HENT: Head atraumatic, mucous membranes moist  Eyes:  Conjunctivae normal  Neck:  Supple  Lungs: Clear to auscultation, breath sounds equal and symmetric, no wheezes, rales, or rhonchi  Heart: Tachycardic during triage, but on my exam regular rate and rhythm, no murmur, rub or gallop  Abdomen: Soft, nondistended, mildly tender to palpation across the lower abdomen, no rebound or guarding  Extremities: No lower extremity edema  Neuro: Alert, no focal deficit  Skin: Warm, dry  Psych: Calm, cooperative     Medical Decision Making & ED Course   Medical Decision Making:  Meredith Mckenzie is a 29 y.o. female with PMHx as above in HPI who presented to the ED for diarrhea and lower abdominal pain. Patient was afebrile, hemodynamically stable, and satting on room air on arrival.     Exam as above.    Clinical course as below in ED course:  ED Course as of 12/01/24 2231   Sun Dec 01,  2024 1325 External chart review:  DC summary 11/25/2024  For intra-abdominal fluid collection.  Discharged on course of Augmentin.     [SS]   1327 CBC and Auto Differential(!)  No leukocytosis, anemia, or thrombocytopenia'   [SS]   1349 Comprehensive metabolic panel(!)  No clinically significant electrolyte abnormalities, no ARTEMIO, not suggestive of obstructive biliary pathology or acute liver injury   [SS]   1349 LIPASE: 15  normal [SS]   1349 MAGNESIUM: 1.87  normal [SS]   1437 CT abdomen pelvis w IV contrast  Radiology read:  IMPRESSION:  Persistent multifocal fluid collection in the pelvis with mildly  enhancing rim. This may represent hematoma or seroma but abscess  cannot be excluded. Correlate clinically.   [SS]   1457 Personally discussed currently available facts and concerns about the case with GYN, who advises reviwed imaging, fluid collection   [SS]   1506 External chart review:  UA yesterday not suggestive of UTI     [SS]      ED Course User Index  [SS] Steffen Simerlink, MD         Diagnoses as of 12/01/24 2231   Diarrhea, unspecified type   Lower abdominal pain   Abdominal fluid collection       A/P:    CT shows decreased size of intra-abdominal collection compared to prior.  Otherwise shows no new acute processes.  Labs are reassuring.  C. difficile PCR was sent.  Discussed with gynecology who reviewed imaging and recommend continuing antibiotics until follow-up in clinic.    On re-examination, they are well-appearing and have remained hemodynamically stable. They are ambulating without difficulty and tolerating PO. They are appropriate for discharge at this time. They will follow up with GYN. Return precautions were reviewed. Plan was discussed with patient and they are agreeable.  ------------------------------------------------  Independent Test Interpretation: See ED Course  Discussion with Other Providers: See ED Course  Admission considered labs are reassuring, she is tolerating p.o.,  intra-abdominal collection decreasing in size    Disposition   As a result of the work-up, the patient was discharged home.  she was informed of her diagnosis and instructed to come back with any concerns or worsening of condition.  she and was agreeable to the plan as discussed above.  she was given the opportunity to ask questions.  All of the patient's questions were answered.    Procedures   Procedures    Steffen Simerlink, MD Steffen Simerlink, MD  12/01/24 8575

## 2024-12-02 LAB — HOLD SPECIMEN: NORMAL

## 2024-12-02 NOTE — PROGRESS NOTES
"Division of Minimally Invasive Gynecologic Surgery  [facility]    12/02/24 Gynecology Visit    Meredith Mckenzie is a 29 y.o. status post TRH-BS, left ovarian cystectomy, cystoscopy on 10/24/24 presents for post op check. Post operative course has been complicated by recent diagnosis of pelvic abscess. Currently completing a course of Augmentin and struggling w/ diarrhea. C. Diff testing negative.     Meeting all milestones. Diarrhea is decreasing in volume/frequency, but not resolved.  She does have 2 more days of amoxicillin. Some urinary urgency as well, urine studies negative. Denies vaginal bleeding/spotting. Low volume and intermittent yellow-clear vaginal discharge.     PMHx, PSHx, SHx, Allergies, and Medications updated in Epic.    ROS: reviewed and negative    PE: /80   Pulse 94   Ht 1.676 m (5' 6\")   Wt 65.4 kg (144 lb 3.2 oz)   BMI 23.27 kg/m²    Constitutional:  No acute distress  HEENT: EOM grossly intact, MMM, neck supple and with full ROM  Pulm:  Effort normal. No accessory muscle usage.  No respiratory distress.  Abd: soft, non-distended, non-tender, no palpable masses, incisions c/d/i  Neurological:  AAO x 3, appropriate to interview  Skin: Warm, no pallor.  Psychiatric:  normal mood and affect.    Assessment/Plan:     Meredith Mckenzie is a 29 y.o. status post TRH-BS, left ovarian cystectomy, cystoscopy on 10/24/24 presents for post op check. Post operative course has been complicated by recent diagnosis of pelvic abscess. Currently completing a course of Augmentin and struggling w/ diarrhea. C. Diff testing negative.   - Recovering as expected, no new concerns today  - Path reviewed, benign, confirmed endometriosis  - No indication for further pap smears  - Continue post op restrictions until 6 weeks after surgery, reviewed these today  -  RTC for cuff check, she will take home Ativan prior.       Claritza Fenton MD  Division of Minimally Invasive " Gynecologic Surgery  [facility]

## 2024-12-03 ENCOUNTER — OFFICE VISIT (OUTPATIENT)
Dept: OBSTETRICS AND GYNECOLOGY | Facility: CLINIC | Age: 29
End: 2024-12-03
Payer: COMMERCIAL

## 2024-12-03 VITALS
HEART RATE: 94 BPM | DIASTOLIC BLOOD PRESSURE: 80 MMHG | HEIGHT: 66 IN | WEIGHT: 144.2 LBS | SYSTOLIC BLOOD PRESSURE: 123 MMHG | BODY MASS INDEX: 23.18 KG/M2

## 2024-12-03 DIAGNOSIS — R39.15 URINARY URGENCY: ICD-10-CM

## 2024-12-03 DIAGNOSIS — Z48.89 POSTOPERATIVE VISIT: Primary | ICD-10-CM

## 2024-12-03 PROCEDURE — 3008F BODY MASS INDEX DOCD: CPT | Performed by: STUDENT IN AN ORGANIZED HEALTH CARE EDUCATION/TRAINING PROGRAM

## 2024-12-03 PROCEDURE — 99211 OFF/OP EST MAY X REQ PHY/QHP: CPT | Performed by: STUDENT IN AN ORGANIZED HEALTH CARE EDUCATION/TRAINING PROGRAM

## 2024-12-03 RX ORDER — PHENAZOPYRIDINE HYDROCHLORIDE 200 MG/1
200 TABLET, FILM COATED ORAL 2 TIMES DAILY PRN
Qty: 40 TABLET | Refills: 0 | Status: SHIPPED | OUTPATIENT
Start: 2024-12-03

## 2024-12-03 ASSESSMENT — ENCOUNTER SYMPTOMS
ENDOCRINE NEGATIVE: 0
HEMATOLOGIC/LYMPHATIC NEGATIVE: 0
RESPIRATORY NEGATIVE: 0
MUSCULOSKELETAL NEGATIVE: 0
CONSTITUTIONAL NEGATIVE: 0
CARDIOVASCULAR NEGATIVE: 0
GASTROINTESTINAL NEGATIVE: 0
EYES NEGATIVE: 0
ALLERGIC/IMMUNOLOGIC NEGATIVE: 0
NEUROLOGICAL NEGATIVE: 0
PSYCHIATRIC NEGATIVE: 0

## 2024-12-03 ASSESSMENT — PAIN SCALES - GENERAL: PAINLEVEL_OUTOF10: 3

## 2024-12-22 NOTE — PROGRESS NOTES
Division of Minimally Invasive Gynecologic Surgery  [facility]      Date: 12/23/24 - Gynecology Visit    Meredith Mckenzie is a 29 y.o. status post TRH-BS, left ovarian cystectomy, cystoscopy on 10/24/24 presents for post op check. Post operative course has been complicated by recent diagnosis of pelvic abscess. Completed a course of Augmentin which led to diarrhea. C. Diff testing negative.     BM's normal, but 10/10 pain prior to BM. Relieved w/ BM. Random sharp pains throughout pelvis but intermittent and short lived. She does still have end void dysuria, but no symptoms c/w UTI.     Done w/ antibiotics. Did try Pyridium, which helped about 75% w/ dysuria. Pain is still present, but it is improving.     PMHx, PSHx, SHx, Allergies, and Medications updated in Epic.    ROS: reviewed and negative    PE: BP (!) 132/91   Wt 63.5 kg (140 lb)   BMI 22.60 kg/m²    Constitutional:  No acute distress, well-nourished and well-developed  HEENT: EOM grossly intact, MMM, neck supple and with full ROM  Pulm:  Effort normal. No accessory muscle usage.  No respiratory distress.  :  - EGBUS: grossly WNL  - Digital exam: Cuff intact   Neurological:  She is alert and oriented to person place and time.  Skin: Warm, no pallor.  Psychiatric:  She has normal mood and affect.      Assessment/Plan:     Meredith Mckenzie is a 29 y.o. status post TRH-BS, left ovarian cystectomy, cystoscopy on 10/24/24 presents for post op check. Post operative course has been complicated by recent diagnosis of pelvic abscess. Currently completing a course of Augmentin and struggling w/ diarrhea. C. Diff testing negative.   - Recovering appropriately   - Path reviewed, benign, confirmed endometriosis  - No indication for further pap smears  - Continues to struggle with fatigue given difficult post op recovery. Plan to extend leave further as she has had set backs in her recovery course.   - OK to restart PFPT when she feels  ready  - RTC in 3 months or earlier PRN, virtual visit ok     Claritza Fenton MD  Division of Minimally Invasive Gynecologic Surgery  [facility]

## 2024-12-23 ENCOUNTER — OFFICE VISIT (OUTPATIENT)
Dept: OBSTETRICS AND GYNECOLOGY | Facility: HOSPITAL | Age: 29
End: 2024-12-23
Payer: COMMERCIAL

## 2024-12-23 ENCOUNTER — LAB (OUTPATIENT)
Dept: LAB | Facility: LAB | Age: 29
End: 2024-12-23
Payer: COMMERCIAL

## 2024-12-23 VITALS — SYSTOLIC BLOOD PRESSURE: 132 MMHG | WEIGHT: 140 LBS | DIASTOLIC BLOOD PRESSURE: 91 MMHG | BODY MASS INDEX: 22.6 KG/M2

## 2024-12-23 DIAGNOSIS — N73.9 PELVIC ABSCESS IN FEMALE: ICD-10-CM

## 2024-12-23 DIAGNOSIS — Z48.89 POSTOPERATIVE VISIT: Primary | ICD-10-CM

## 2024-12-23 LAB
ERYTHROCYTE [DISTWIDTH] IN BLOOD BY AUTOMATED COUNT: 11.8 % (ref 11.5–14.5)
HCT VFR BLD AUTO: 39.9 % (ref 36–46)
HGB BLD-MCNC: 13.1 G/DL (ref 12–16)
MCH RBC QN AUTO: 30.6 PG (ref 26–34)
MCHC RBC AUTO-ENTMCNC: 32.8 G/DL (ref 32–36)
MCV RBC AUTO: 93 FL (ref 80–100)
NRBC BLD-RTO: 0 /100 WBCS (ref 0–0)
PLATELET # BLD AUTO: 315 X10*3/UL (ref 150–450)
RBC # BLD AUTO: 4.28 X10*6/UL (ref 4–5.2)
WBC # BLD AUTO: 7.9 X10*3/UL (ref 4.4–11.3)

## 2024-12-23 PROCEDURE — 99211 OFF/OP EST MAY X REQ PHY/QHP: CPT | Performed by: STUDENT IN AN ORGANIZED HEALTH CARE EDUCATION/TRAINING PROGRAM

## 2024-12-23 PROCEDURE — 1036F TOBACCO NON-USER: CPT | Performed by: STUDENT IN AN ORGANIZED HEALTH CARE EDUCATION/TRAINING PROGRAM

## 2024-12-23 PROCEDURE — 85027 COMPLETE CBC AUTOMATED: CPT

## 2024-12-23 ASSESSMENT — ENCOUNTER SYMPTOMS
LOSS OF SENSATION IN FEET: 0
DEPRESSION: 0
OCCASIONAL FEELINGS OF UNSTEADINESS: 0

## 2025-03-04 ENCOUNTER — APPOINTMENT (OUTPATIENT)
Dept: OBSTETRICS AND GYNECOLOGY | Facility: CLINIC | Age: 30
End: 2025-03-04
Payer: COMMERCIAL

## 2025-04-15 ENCOUNTER — OFFICE VISIT (OUTPATIENT)
Dept: OBSTETRICS AND GYNECOLOGY | Facility: CLINIC | Age: 30
End: 2025-04-15
Payer: COMMERCIAL

## 2025-04-15 VITALS
HEART RATE: 84 BPM | BODY MASS INDEX: 21.86 KG/M2 | SYSTOLIC BLOOD PRESSURE: 136 MMHG | HEIGHT: 66 IN | DIASTOLIC BLOOD PRESSURE: 84 MMHG | WEIGHT: 136 LBS

## 2025-04-15 DIAGNOSIS — R10.2 PELVIC PAIN: ICD-10-CM

## 2025-04-15 DIAGNOSIS — K59.00 DYSCHEZIA: ICD-10-CM

## 2025-04-15 DIAGNOSIS — N80.9 ENDOMETRIOSIS: Primary | ICD-10-CM

## 2025-04-15 DIAGNOSIS — R39.89 BLADDER PAIN: ICD-10-CM

## 2025-04-15 PROCEDURE — 99214 OFFICE O/P EST MOD 30 MIN: CPT | Performed by: STUDENT IN AN ORGANIZED HEALTH CARE EDUCATION/TRAINING PROGRAM

## 2025-04-15 PROCEDURE — 3008F BODY MASS INDEX DOCD: CPT | Performed by: STUDENT IN AN ORGANIZED HEALTH CARE EDUCATION/TRAINING PROGRAM

## 2025-04-15 RX ORDER — PHENAZOPYRIDINE HYDROCHLORIDE 200 MG/1
200 TABLET, FILM COATED ORAL 3 TIMES DAILY PRN
Qty: 30 TABLET | Refills: 0 | Status: SHIPPED | OUTPATIENT
Start: 2025-04-15

## 2025-04-15 ASSESSMENT — PAIN SCALES - GENERAL: PAINLEVEL_OUTOF10: 2

## 2025-04-15 ASSESSMENT — ENCOUNTER SYMPTOMS
RESPIRATORY NEGATIVE: 0
PSYCHIATRIC NEGATIVE: 0
HEMATOLOGIC/LYMPHATIC NEGATIVE: 0
CARDIOVASCULAR NEGATIVE: 0
EYES NEGATIVE: 0
ALLERGIC/IMMUNOLOGIC NEGATIVE: 0
GASTROINTESTINAL NEGATIVE: 0
NEUROLOGICAL NEGATIVE: 0
CONSTITUTIONAL NEGATIVE: 0
ENDOCRINE NEGATIVE: 0
MUSCULOSKELETAL NEGATIVE: 0

## 2025-04-15 NOTE — PROGRESS NOTES
"Division of Minimally Invasive Gynecologic Surgery  [facility]      Date: 4/15/25 - Gynecology Visit    Meredith Mckenzie is a 29 y.o. w/ path-proven endometriosis, vaginismus, vulvodynia, and high tone pelvic floor dysfunction and now status post TRH-BS, left ovarian cystectomy, cystoscopy on 10/24/24 presents in follow up for pelvic pain.     She has been doing PFPT since January with very minimal improvement in pain. She does still have frequent bladder pain (always worse in morning), end voiding pain is the worst. End void pain can be debilitating enough to force her to lie down. This doesn't happen every time she voids. She also has intermittent pinching sensations in the bladder area.     She does still have pain with every BM. Pain is anywhere from a 3-4/10 to a 10/10. All on her left side, internal.     She has tried:  - Cymbalta, Prozac, Trazodone, Risperdal - she's tried multiple anti-depressants, anti-anxiety medications, and mood stabilizers   - Lidocaine jelly: helpful a little bit   - Vibrators/digits for dilation  - Flexeril: Significant sedation      She has not tried:   - Pelvic floor TPI  - Pelvic floor Botox      Imaging:   - MRI pelvis showed uterus measuring 8cm x 4cm x 6cm, suspicious for adenomyosis. Otherwise unremarkable.   - Pelvic US 2/2022 showed uterus measuring 7cm x 2cm x 3cm. Bilateral ovaries WNL.   Screening:   - Last pap: s/p benign hyst, no indication for further hysterectomies   PMHx: depression/anxiety, BD2, HLD, cystic acne, palpitations (s/p normal Cards consult, PVC's)  PSHx: wisdom teeth, adenoids, ear tubes, TRH-BS/excision of endometriosis/left ovarian cystectomy        PMHx, PSHx, SHx, Allergies, and Medications updated in Epic.    ROS: reviewed and negative    PE: /84   Pulse 84   Ht 1.676 m (5' 6\")   Wt 61.7 kg (136 lb)   BMI 21.95 kg/m²    Constitutional:  No acute distress, well-nourished and well-developed  HEENT: EOM " grossly intact, MMM, neck supple and with full ROM  Pulm:  Effort normal. No accessory muscle usage.  No respiratory distress.  :  - EGBUS: grossly WNL  - Digital exam: Bilateral spasm of LA muscles, R OI w/ tenderness and spasm   Neurological:  She is alert and oriented to person place and time.  Skin: Warm, no pallor.  Psychiatric:  She has normal mood and affect.    Assessment/Plan:     Meredith Mckenzie is a 29 y.o. w/ path-proven endometriosis, vaginismus, vulvodynia, and high tone pelvic floor dysfunction and now status post TRH-BS, left ovarian cystectomy, cystoscopy on 10/24/24 presents in follow up for pelvic pain.   - Discussed multiple options for symptom management/evaluation, including PRN Pyridium, pelvic floor Botox/pudendal nerve block, referral to GI, and consideration of Pain Medicine referral.   - She is interested in starting w/ Pyridium and GI referral. She is hesitant about botox, but will consider.     Claritza Fenton MD  Division of Minimally Invasive Gynecologic Surgery  [facility]

## 2025-06-03 ENCOUNTER — TELEPHONE (OUTPATIENT)
Dept: OBSTETRICS AND GYNECOLOGY | Facility: CLINIC | Age: 30
End: 2025-06-03
Payer: COMMERCIAL

## 2025-06-03 NOTE — TELEPHONE ENCOUNTER
Pelvic physical therapy- patient is in a lot of pain and she wanted her physical therapist to call and speak with a nurse about her options for this patient.       Physical therapy andi Ramachandran   Pelvic physical therapist    Phone [phone]

## 2025-06-03 NOTE — TELEPHONE ENCOUNTER
Patient's physical therapist says the Meredith would like to consider some other options to manage her pelvic floor/pain options.    Encourage patient to schedule a virtual appointment for follow up with Dr. Fenton to discuss and or send a [facility] message.

## (undated) DEVICE — STRIP, SKIN CLOSURE, STERI STRIP, REINFORCED, 0.5 X 4 IN

## (undated) DEVICE — DRESSING, NON-ADHERENT, TELFA, 3 X 8 IN, NS

## (undated) DEVICE — PROTECTOR, NERVE, ULNAR, PINK

## (undated) DEVICE — SUTURE, VICRYL, 0, 27 IN, UR-6, VIOLET

## (undated) DEVICE — TOWELS 4-PK

## (undated) DEVICE — SUTURE, MONOCRYL PLUS, 4-0, PS-2 UD 27IN

## (undated) DEVICE — APPLICATOR, ENDOSCOPIC FLOSEAL

## (undated) DEVICE — COVER, TIP HOT SHEARS ENDOWRIST

## (undated) DEVICE — BAG, DRAIN METER, URINE, 350CC, LF

## (undated) DEVICE — GOWN, SURGICAL, SMARTGOWN, XLARGE, STERILE

## (undated) DEVICE — CATHETER, URETHRAL, FOLEY, 2 WAY, 16 FR, 5 CC, SILICONE

## (undated) DEVICE — SPONGE, LAP, XRAY DECT, 18IN X 18IN, W/LOOP, STERILE

## (undated) DEVICE — DRAPE PACK, LAVH, W/ATTACHED LEGGINGS, W/POUCH, 100 X 114 IN, LF, STERILE

## (undated) DEVICE — KIT, ROBOTIC, CUSTOM UHC

## (undated) DEVICE — TUBING SET, TRI-LUMEN, FILTERED, F/AIRSEAL

## (undated) DEVICE — APPLICATOR, ENDOSCOPIC, F/SURGICEL POWDER

## (undated) DEVICE — OCCLUDER, COLPO-PNEUMO

## (undated) DEVICE — PREP TRAY, SKIN, DRY, W/GLOVES

## (undated) DEVICE — RETRACTOR, CERVICAL CUP, VCARE, STANDARD

## (undated) DEVICE — TROCAR SYSTEM, BALLOON, KII GELPORT, 12 X 100MM

## (undated) DEVICE — SYRINGE, 60 CC, LUER LOCK, MONOJECT

## (undated) DEVICE — OBTURATOR, BLADELESS , SU

## (undated) DEVICE — DRESSING, ADHESIVE, ISLAND, TELFA, 2 X 3.75 IN, LF

## (undated) DEVICE — DRAPE, ARM XI

## (undated) DEVICE — SEALER, VESSEL, EXTENDED

## (undated) DEVICE — DRAPE, PAD, PREP, W/ 9 IN CUFF, 24 X 41, LF, NS

## (undated) DEVICE — HEMOSTAT, SURGICEL POWDER 3.0GRAMS

## (undated) DEVICE — SPONGE GAUZE, XRAY SC+RFID, 4X4 16 PLY, STERILE

## (undated) DEVICE — IRRIGATION SET, CYSTOSCOPY, F/CONSTANT/INTERMITTENT, 8 GTT/CC, 77 IN

## (undated) DEVICE — POSITIONING, THE PINK PAD, PIGAZZI SYSTEM

## (undated) DEVICE — ADHESIVE, SKIN, MASTISOL, 2/3 CC VIAL

## (undated) DEVICE — ACCESS PORT, 8M M, LOW PROFILE W/BLADELESS OPTICAL TIP, 100MM LENGTH

## (undated) DEVICE — SUTURE, V-LOC, 2-0, 6IN, GS-22, GREEN

## (undated) DEVICE — SEAL, UNIVERSAL, 5-12MM

## (undated) DEVICE — MANIFOLD, 4 PORT NEPTUNE STANDARD

## (undated) DEVICE — DRAPE, COLUMN, DAVINCI XI